# Patient Record
Sex: MALE | Race: WHITE | NOT HISPANIC OR LATINO | Employment: UNEMPLOYED | URBAN - METROPOLITAN AREA
[De-identification: names, ages, dates, MRNs, and addresses within clinical notes are randomized per-mention and may not be internally consistent; named-entity substitution may affect disease eponyms.]

---

## 2017-02-06 DIAGNOSIS — H35.712 CENTRAL SEROUS CHORIORETINOPATHY OF LEFT EYE: Primary | ICD-10-CM

## 2017-02-15 ENCOUNTER — OFFICE VISIT (OUTPATIENT)
Dept: OPHTHALMOLOGY | Facility: CLINIC | Age: 47
End: 2017-02-15
Attending: OPHTHALMOLOGY
Payer: COMMERCIAL

## 2017-02-15 DIAGNOSIS — H35.712 CENTRAL SEROUS CHORIORETINOPATHY OF LEFT EYE: ICD-10-CM

## 2017-02-15 PROCEDURE — 99214 OFFICE O/P EST MOD 30 MIN: CPT | Mod: 25,ZF

## 2017-02-15 PROCEDURE — 99214 OFFICE O/P EST MOD 30 MIN: CPT

## 2017-02-15 PROCEDURE — 92015 DETERMINE REFRACTIVE STATE: CPT | Mod: ZF

## 2017-02-15 ASSESSMENT — REFRACTION_MANIFEST
OS_ADD: +1.75
OS_SPHERE: -0.50
OD_AXIS: 110
OS_AXIS: 008
OS_CYLINDER: +1.00
OD_SPHERE: -0.50
OD_ADD: +1.75
OD_CYLINDER: +4.50

## 2017-02-15 ASSESSMENT — CUP TO DISC RATIO
OS_RATIO: 0.65
OD_RATIO: 0.65

## 2017-02-15 ASSESSMENT — REFRACTION_WEARINGRX
OD_SPHERE: -0.75
OS_CYLINDER: +1.00
OS_AXIS: 008
OS_SPHERE: -0.50
OD_CYLINDER: +4.50
OS_ADD: +1.75
OD_ADD: +1.75
OD_AXIS: 110

## 2017-02-15 ASSESSMENT — TONOMETRY
IOP_METHOD: TONOPEN
OD_IOP_MMHG: 17
OS_IOP_MMHG: 16

## 2017-02-15 ASSESSMENT — VISUAL ACUITY
OS_CC+: -2
METHOD: SNELLEN - LINEAR
CORRECTION_TYPE: GLASSES
OS_CC: 20/25
OD_CC+: -3
OD_CC: 20/20

## 2017-02-15 ASSESSMENT — EXTERNAL EXAM - RIGHT EYE: OD_EXAM: NORMAL

## 2017-02-15 ASSESSMENT — EXTERNAL EXAM - LEFT EYE: OS_EXAM: NORMAL

## 2017-02-15 ASSESSMENT — CONF VISUAL FIELD
OS_NORMAL: 1
OD_NORMAL: 1

## 2017-02-15 NOTE — PROGRESS NOTES
CC: follow up Central serous retinopathy  HPI: 46-year-old gentleman who has a history of vision loss in the left eye since 2006.    No acute changes in vision, no flashes and floaters     Imaging:   OCT 2/17/17 :    RIGHT EYE:  OCT revealed good foveal contour.  There were no cystic changes.    LEFT EYE:   good foveal contour. Stable appearance from last OCT--disruption of the IS/OS junction and retinal pigment epithelium. No subretinal fluid. Thick choroid    AF 2/17/17 :   Right eye: no abnormalities  Left eye: Retinal pigment epithelium atrophy with surrounding hyperfluorescence.     Assessment/Plan :   1)  Central serous chorioretinopathy, left eye.   Stable distortion   Stable without subretinal fluid.   Continue using Amsler grid    Used Flonase nasal spray rarely    Patient with history of sleep apnea- diagnosed 2 ys ago. Uses CPAP   Stop caffeine intake    2)  Cataract, both eyes   becoming visually significant    monitor       3)  History of Chiari I malformation based on MRI findings.     Follow up 1 yr with retina. Prescription next time    ~~~~~~~~~~~~~~~~~~~~~~~~~~~~~~~~~~~~~~~~~~~~~~~~~~~~~~~~~~~~~~~~~~~~~~~~~~~~~~~~~~  I personally viewed the patient's HPI and review of systems entered by [employer].  I have discussed the case and the management of this patient's care with the Resident/Fellow, if applicable. I also have reviewed and agree with the assessment and plan as stated above and agree with all of its relevant components. I personally viewed the patient images and I agree with the interpretation as documented by the resident/fellow and edited by me. I was present for critical portions of the procedure and was immediately available for the entire procedure.  Diana Patrcik M.D.

## 2017-02-15 NOTE — MR AVS SNAPSHOT
After Visit Summary   2/15/2017    Zaheer Mcgee    MRN: [id number]           Patient Information     Date Of Birth          1970        Visit Information        Provider Department      2/15/2017 8:45 AM Diana Patrick MD [facility]        Today's Diagnoses     Central serous chorioretinopathy of left eye           Follow-ups after your visit        Follow-up notes from your care team     Return in about 1 year (around 2/15/2018) for OCT AF.      Your next 10 appointments already scheduled     Feb 14, 2018  8:30 AM CST   RETURN RETINA with Diana Patrick MD   Eye Clinic ([facility])    [address]  [address] [place]  [address]  [facility] [address] [phone]              Who to contact     Please call your clinic at [phone] to:    Ask questions about your health    Make or cancel appointments    Discuss your medicines    Learn about your test results    Speak to your doctor   If you have compliments or concerns about an experience at your clinic, or if you wish to file a complaint, please contact [facility] Patient Relations at [phone] or email us at [email or web address]         Additional Information About Your Visit        MyChart Information     [employer] gives you secure access to your electronic health record. If you see a primary care provider, you can also send messages to your care team and make appointments. If you have questions, please call your primary care clinic.  If you do not have a primary care provider, please call [phone] and they will assist you.      [employer] is an electronic gateway that provides easy, online access to your medical records. With [employer], you can request a clinic appointment, read your test results, renew a prescription or communicate with your care team.     To access your existing account, please contact your [facility] or  call [phone] for assistance.        Care EveryWhere ID     This is your Care EveryWhere ID. This could be used by other organizations to access your [place] medical records  [id number]         Blood Pressure from Last 3 Encounters:   07/18/12 124/80    Weight from Last 3 Encounters:   07/18/12 78.9 kg (174 lb)              We Performed the Following     Fundus Autofluorescence Image (FAF) OU (both eyes)     OCT Retina Spectralis OU (both eyes)        Primary Care Provider Office Phone # Fax #    [facility] [phone] [address]  [facility] [address]        Thank you!     Thank you for choosing EYE CLINIC  for your care. Our goal is always to provide you with excellent care. Hearing back from our patients is one way we can continue to improve our services. Please take a few minutes to complete the written survey that you may receive in the mail after your visit with us. Thank you!             Your Updated Medication List - Protect others around you: Learn how to safely use, store and throw away your medicines at www.disposemymeds.org.          This list is accurate as of: 2/15/17 11:10 AM.  Always use your most recent med list.                   Brand Name Dispense Instructions for use    IBUPROFEN PO      Take 600 mg by mouth 3 times daily       NO ACTIVE MEDICATIONS          OMEPRAZOLE PO      Take by mouth daily       TRAMADOL HCL PO      Take by mouth 2 times daily       ZANTAC PO      Take by mouth At Bedtime

## 2017-02-15 NOTE — NURSING NOTE
Chief Complaints and History of Present Illnesses   Patient presents with     Follow Up For      LE     HPI    Last Eye Exam:  2/4/16   Affected eye(s):  Left   Symptoms:     Decreased vision   Itching      Duration:  1 year   Frequency:  Constant       Do you have eye pain now?:  No      Comments:  Pt returns for yearly follow up on health of eyes. Pt feels that vision has gradually changed and requests a new glasses rx today. BE feel occasionally itch, notes that it is mostly environmental issues with work. CRISTIAN MILTON, COA 9:30 AM 02/15/2017

## 2018-01-29 DIAGNOSIS — H35.712 CENTRAL SEROUS CHORIORETINOPATHY OF LEFT EYE: Primary | ICD-10-CM

## 2019-02-11 ENCOUNTER — TELEPHONE (OUTPATIENT)
Dept: OPHTHALMOLOGY | Facility: CLINIC | Age: 49
End: 2019-02-11

## 2019-02-11 NOTE — TELEPHONE ENCOUNTER
Health Call Center    Phone Message    May a detailed message be left on voicemail: yes    Reason for Call: Other: Pt is needing Dr.Sandra Patrick to fax all of his records to [facility] clinic attn to Jessie Ge. [facility] number is [phone]. Please fax pts records to the [facility]. Please call the pt. Thank you     Action Taken: Message routed to:  [facility] Eye

## 2019-02-14 ENCOUNTER — TELEPHONE (OUTPATIENT)
Dept: OPHTHALMOLOGY | Facility: CLINIC | Age: 49
End: 2019-02-14

## 2019-02-14 NOTE — TELEPHONE ENCOUNTER
Note to facilitator for further assistance  Saleem Melendez RN 9:36 AM 02/15/19        [facility]    Phone Message    May a detailed message be left on voicemail: yes    Reason for Call: Other: Pt states that the STEPHANIE forrm that was faxed to [facility] attn to Jessie Ge they did not receive. Pt wants to know if the STEPHANIE could be faxed to him at [phone]. Please advise thank you.       Action Taken: Message routed to:  [facility] Eye

## 2019-10-04 ENCOUNTER — HEALTH MAINTENANCE LETTER (OUTPATIENT)
Age: 49
End: 2019-10-04

## 2020-11-08 ENCOUNTER — HEALTH MAINTENANCE LETTER (OUTPATIENT)
Age: 50
End: 2020-11-08

## 2021-09-11 ENCOUNTER — HEALTH MAINTENANCE LETTER (OUTPATIENT)
Age: 51
End: 2021-09-11

## 2021-09-20 ENCOUNTER — APPOINTMENT (OUTPATIENT)
Dept: CT IMAGING | Facility: HOSPITAL | Age: 51
End: 2021-09-20
Attending: PHYSICIAN ASSISTANT
Payer: COMMERCIAL

## 2021-09-20 ENCOUNTER — HOSPITAL ENCOUNTER (EMERGENCY)
Facility: HOSPITAL | Age: 51
Discharge: HOME | End: 2021-09-20
Attending: PHYSICIAN ASSISTANT | Admitting: PHYSICIAN ASSISTANT
Payer: COMMERCIAL

## 2021-09-20 VITALS
RESPIRATION RATE: 16 BRPM | HEART RATE: 83 BPM | DIASTOLIC BLOOD PRESSURE: 82 MMHG | SYSTOLIC BLOOD PRESSURE: 130 MMHG | TEMPERATURE: 98.2 F | OXYGEN SATURATION: 97 %

## 2021-09-20 DIAGNOSIS — R10.31 RIGHT LOWER QUADRANT PAIN: ICD-10-CM

## 2021-09-20 LAB
ALBUMIN UR-MCNC: NEGATIVE MG/DL
ANION GAP SERPL CALCULATED.3IONS-SCNC: 4 MMOL/L (ref 3–14)
APPEARANCE UR: CLEAR
BASOPHILS # BLD AUTO: 0.1 10E3/UL (ref 0–0.2)
BASOPHILS NFR BLD AUTO: 1 %
BILIRUB UR QL STRIP: NEGATIVE
BUN SERPL-MCNC: 16 MG/DL (ref 7–30)
CALCIUM SERPL-MCNC: 8.9 MG/DL (ref 8.5–10.1)
CHLORIDE BLD-SCNC: 106 MMOL/L (ref 94–109)
CO2 SERPL-SCNC: 29 MMOL/L (ref 20–32)
COLOR UR AUTO: NORMAL
CREAT SERPL-MCNC: 0.83 MG/DL (ref 0.66–1.25)
EOSINOPHIL # BLD AUTO: 0.3 10E3/UL (ref 0–0.7)
EOSINOPHIL NFR BLD AUTO: 2 %
ERYTHROCYTE [DISTWIDTH] IN BLOOD BY AUTOMATED COUNT: 12.9 % (ref 10–15)
GFR SERPL CREATININE-BSD FRML MDRD: >90 ML/MIN/1.73M2
GLUCOSE BLD-MCNC: 104 MG/DL (ref 70–99)
GLUCOSE UR STRIP-MCNC: NEGATIVE MG/DL
HCT VFR BLD AUTO: 47.5 % (ref 40–53)
HGB BLD-MCNC: 15.8 G/DL (ref 13.3–17.7)
HGB UR QL STRIP: NEGATIVE
IMM GRANULOCYTES # BLD: 0.1 10E3/UL
IMM GRANULOCYTES NFR BLD: 0 %
KETONES UR STRIP-MCNC: NEGATIVE MG/DL
LEUKOCYTE ESTERASE UR QL STRIP: NEGATIVE
LYMPHOCYTES # BLD AUTO: 2.4 10E3/UL (ref 0.8–5.3)
LYMPHOCYTES NFR BLD AUTO: 18 %
MCH RBC QN AUTO: 29.6 PG (ref 26.5–33)
MCHC RBC AUTO-ENTMCNC: 33.3 G/DL (ref 31.5–36.5)
MCV RBC AUTO: 89 FL (ref 78–100)
MONOCYTES # BLD AUTO: 1.2 10E3/UL (ref 0–1.3)
MONOCYTES NFR BLD AUTO: 8 %
NEUTROPHILS # BLD AUTO: 9.7 10E3/UL (ref 1.6–8.3)
NEUTROPHILS NFR BLD AUTO: 71 %
NITRATE UR QL: NEGATIVE
NRBC # BLD AUTO: 0 10E3/UL
NRBC BLD AUTO-RTO: 0 /100
PH UR STRIP: 7 [PH] (ref 4.7–8)
PLATELET # BLD AUTO: 309 10E3/UL (ref 150–450)
POTASSIUM BLD-SCNC: 4 MMOL/L (ref 3.4–5.3)
RBC # BLD AUTO: 5.34 10E6/UL (ref 4.4–5.9)
RBC URINE: 1 /HPF
SODIUM SERPL-SCNC: 139 MMOL/L (ref 133–144)
SP GR UR STRIP: 1.02 (ref 1–1.03)
SQUAMOUS EPITHELIAL: 0 /HPF
UROBILINOGEN UR STRIP-MCNC: NORMAL MG/DL
WBC # BLD AUTO: 13.6 10E3/UL (ref 4–11)
WBC URINE: 2 /HPF

## 2021-09-20 PROCEDURE — 99284 EMERGENCY DEPT VISIT MOD MDM: CPT | Mod: 25

## 2021-09-20 PROCEDURE — 258N000003 HC RX IP 258 OP 636: Performed by: PHYSICIAN ASSISTANT

## 2021-09-20 PROCEDURE — 74176 CT ABD & PELVIS W/O CONTRAST: CPT

## 2021-09-20 PROCEDURE — 81001 URINALYSIS AUTO W/SCOPE: CPT | Performed by: PHYSICIAN ASSISTANT

## 2021-09-20 PROCEDURE — 36415 COLL VENOUS BLD VENIPUNCTURE: CPT | Performed by: PHYSICIAN ASSISTANT

## 2021-09-20 PROCEDURE — 80048 BASIC METABOLIC PNL TOTAL CA: CPT | Performed by: PHYSICIAN ASSISTANT

## 2021-09-20 PROCEDURE — 99284 EMERGENCY DEPT VISIT MOD MDM: CPT | Performed by: PHYSICIAN ASSISTANT

## 2021-09-20 PROCEDURE — 250N000013 HC RX MED GY IP 250 OP 250 PS 637: Performed by: PHYSICIAN ASSISTANT

## 2021-09-20 PROCEDURE — 85025 COMPLETE CBC W/AUTO DIFF WBC: CPT | Performed by: PHYSICIAN ASSISTANT

## 2021-09-20 RX ORDER — SODIUM CHLORIDE 9 MG/ML
INJECTION, SOLUTION INTRAVENOUS CONTINUOUS
Status: DISCONTINUED | OUTPATIENT
Start: 2021-09-20 | End: 2021-09-20 | Stop reason: HOSPADM

## 2021-09-20 RX ORDER — FAMOTIDINE 20 MG/1
20 TABLET, FILM COATED ORAL
COMMUNITY
Start: 2021-03-03 | End: 2023-03-29 | Stop reason: DRUGHIGH

## 2021-09-20 RX ORDER — LISINOPRIL 10 MG/1
TABLET ORAL
COMMUNITY
Start: 2021-03-12 | End: 2023-02-08

## 2021-09-20 RX ORDER — ESCITALOPRAM OXALATE 20 MG/1
20 TABLET ORAL
COMMUNITY
Start: 2021-01-27 | End: 2023-02-08

## 2021-09-20 RX ORDER — OXYCODONE HYDROCHLORIDE 5 MG/1
5 TABLET ORAL ONCE
Status: COMPLETED | OUTPATIENT
Start: 2021-09-20 | End: 2021-09-20

## 2021-09-20 RX ADMIN — SODIUM CHLORIDE 1000 ML: 9 INJECTION, SOLUTION INTRAVENOUS at 14:34

## 2021-09-20 RX ADMIN — OXYCODONE HYDROCHLORIDE 5 MG: 5 TABLET ORAL at 14:25

## 2021-09-20 NOTE — Clinical Note
Zaheer Mcgee was seen and treated in our emergency department on 9/20/2021.  He may return to work on 09/23/2021.  Please excuse Zaheer from work until September 23, 2021.     If you have any questions or concerns, please don't hesitate to call.      Cody Nino PA-C

## 2021-09-20 NOTE — ED TRIAGE NOTES
Pt here with right lower back/flank to his right groin.  Also reports trouble with urinating. Retention problems. Has not noted any blood in it No hx of kidney

## 2021-09-20 NOTE — ED NOTES
Patient given discharge instructions to take tylenol as needed and as directed.     Patient verbalized understanding. Patient condition improved upon discharge.

## 2021-09-20 NOTE — DISCHARGE INSTRUCTIONS
Seen in the emergency department for having pain in your right lower back and your groin.  CT scan showed no signs of appendicitis, no signs of kidney stone, and no signs of diverticulitis.  Also showed no signs of hernia.  Blood work showed no signs systemic infection and appropriate kidney function.  Urinary analysis showed no signs of infection.  This is all reassuring.  If you started having chills or sweats, worsening pain, blood in stool or urine, or inability to eat and drink fluid without vomiting please return to emergency department.  You can take Tylenol for discomfort 1000 mg every 8 hours.  Please follow-up with your clinic doctor on the appointment you have this coming Wednesday.

## 2021-09-20 NOTE — ED PROVIDER NOTES
History     Chief Complaint   Patient presents with     Flank Pain     Dysuria     HPI  Zaheer Mcgee is a 51 year old male who arrives to the emergency department with right CVA pain radiating down to his groin.  This started approximately 3 days ago where patient had pain in his right lower quadrant he noticed that it started in his back today and he also noticed oliguria upon attempted urination.  Patient has no history of BPH.  He has a past medical history of ADHD, anxiety, depression, dyspnea on exertion, GERD, pretension, and ZOFIA.  Patient describes his pain as coming and going currently is 5 out of 10 starting from his right flank and radiating down towards his groin.  He does not have any history of abdominal surgeries and is passing gas without issue.    Allergies:  No Known Allergies    Problem List:    Patient Active Problem List    Diagnosis Date Noted     Central serous retinopathy 03/11/2015     Priority: Medium     Cataract 03/11/2015     Priority: Medium        Past Medical History:    Past Medical History:   Diagnosis Date     Chiari I malformation (H)       (central serous retinopathy)      Nonsenile cataract        Past Surgical History:    Past Surgical History:   Procedure Laterality Date     groin surgery  1999     STRABISMUS SURGERY Right 1972       Family History:    Family History   Problem Relation Age of Onset     Glaucoma Mother      Diabetes Maternal Grandmother        Social History:  Marital Status:   [2]  Social History     Tobacco Use     Smoking status: Former Smoker     Packs/day: 2.00     Smokeless tobacco: Never Used   Substance Use Topics     Alcohol use: Not on file     Drug use: Not on file        Medications:    escitalopram (LEXAPRO) 20 MG tablet  famotidine (PEPCID) 20 MG tablet  lisinopril (ZESTRIL) 10 MG tablet  OMEPRAZOLE PO  TRAMADOL HCL PO          Review of Systems he denies any chills or sweats, any headache dizziness or blurry vision, any  difficulty swallowing, denies chest pain or palpitations, shortness of breath or cough, endorses right lower quadrant abdominal pain, denies nausea or vomiting, denies any bowel changes he does state that it is difficult to pee and causes some pain.    Physical Exam   BP: 144/92  Pulse: 83  Temp: 99.6  F (37.6  C)  Resp: 16  SpO2: 98 %      Physical Exam  Constitutional: Alert and conversant. NAD   HENT: NCAT   Eyes: Normal pupils   Neck: supple   CV: Normal rate, regular rhythm, no murmur   Pulmonary/Chest: Non-labored respirations, clear to auscultation bilaterally   Abdominal: Soft, non-tender, non-distended   Gu: Normal-appearing external male genitalia testicles are vertical line in nature there is no inguinal hernia palpated.  MSK: ESPINOZA.   Neuro: Alert and appropriate   Skin: Warm and dry. No diaphoresis. No rashes on exposed skin    Psych: Appropriate mood and affect   ED Course   MDM: Differential diagnosis includes but is not limited to: Colic, hydronephrosis, pyelonephritis, cystitis, appendicitis, cholecystitis, cholangitis, as well as strain or sprain.    Patient does not have any Ingomar tenderness and no obturator sign I am less concerned of appendicitis at this time.  At this point will obtain a CBC, BMP, UA as well as a CT without contrast.  Also start with 5 mg of Roxicodone.  Patient has no changes with eating I am less concerned of any gallbladder pathology at this time.  Suspicion of renal colic.  ED Course as of Sep 20 1621   Mon Sep 20, 2021   1530 No signs of decreased kidney function appropriate electrolytes   Basic metabolic panel(!)   1530 Slight leukocytosis no sings of anemia    CBC with platelets differential(!)   1531 Negative for infection   UA with Microscopic reflex to Culture   1532 IMPRESSION:       No evidence of an acute intra-abdominal process. No hydronephrosis or  collecting system calculus.     An air containing intraspinal synovial cyst is suggested on the left  at L4-5  associated with a degenerated facet joint. Correlate for  evidence of left radiculopathy. Consider follow-up nonemergent MR  lumbar spine.   Abd/pelvis CT - no contrast - Stone Protocol   1532 In speaking with the radiologist there is no signs of renal colic no signs or appendicitis at this time will discharge home with recommendations of follow-up with primary care.      1546 Recommend to return to emergency department if patient starts having significant chills or sweats, worsening abdominal pain, inability to keep food and fluid down.        Procedures                  Results for orders placed or performed during the hospital encounter of 09/20/21 (from the past 24 hour(s))   UA with Microscopic reflex to Culture    Specimen: Urine, Midstream   Result Value Ref Range    Color Urine Light Yellow Colorless, Straw, Light Yellow, Yellow    Appearance Urine Clear Clear    Glucose Urine Negative Negative mg/dL    Bilirubin Urine Negative Negative    Ketones Urine Negative Negative mg/dL    Specific Gravity Urine 1.025 1.003 - 1.035    Blood Urine Negative Negative    pH Urine 7.0 4.7 - 8.0    Protein Albumin Urine Negative Negative mg/dL    Urobilinogen Urine Normal Normal, 2.0 mg/dL    Nitrite Urine Negative Negative    Leukocyte Esterase Urine Negative Negative    RBC Urine 1 <=2 /HPF    WBC Urine 2 <=5 /HPF    Squamous Epithelials Urine 0 <=1 /HPF    Narrative    Urine Culture not indicated   CBC with platelets differential    Narrative    The following orders were created for panel order CBC with platelets differential.  Procedure                               Abnormality         Status                     ---------                               -----------         ------                     CBC with platelets and d...[[id number]]  Abnormal            Final result                 Please view results for these tests on the individual orders.   Basic metabolic panel   Result Value Ref Range    Sodium 139 133 - 144  mmol/L    Potassium 4.0 3.4 - 5.3 mmol/L    Chloride 106 94 - 109 mmol/L    Carbon Dioxide (CO2) 29 20 - 32 mmol/L    Anion Gap 4 3 - 14 mmol/L    Urea Nitrogen 16 7 - 30 mg/dL    Creatinine 0.83 0.66 - 1.25 mg/dL    Calcium 8.9 8.5 - 10.1 mg/dL    Glucose 104 (H) 70 - 99 mg/dL    GFR Estimate >90 >60 mL/min/1.73m2   CBC with platelets and differential   Result Value Ref Range    WBC Count 13.6 (H) 4.0 - 11.0 10e3/uL    RBC Count 5.34 4.40 - 5.90 10e6/uL    Hemoglobin 15.8 13.3 - 17.7 g/dL    Hematocrit 47.5 40.0 - 53.0 %    MCV 89 78 - 100 fL    MCH 29.6 26.5 - 33.0 pg    MCHC 33.3 31.5 - 36.5 g/dL    RDW 12.9 10.0 - 15.0 %    Platelet Count 309 150 - 450 10e3/uL    % Neutrophils 71 %    % Lymphocytes 18 %    % Monocytes 8 %    % Eosinophils 2 %    % Basophils 1 %    % Immature Granulocytes 0 %    NRBCs per 100 WBC 0 <1 /100    Absolute Neutrophils 9.7 (H) 1.6 - 8.3 10e3/uL    Absolute Lymphocytes 2.4 0.8 - 5.3 10e3/uL    Absolute Monocytes 1.2 0.0 - 1.3 10e3/uL    Absolute Eosinophils 0.3 0.0 - 0.7 10e3/uL    Absolute Basophils 0.1 0.0 - 0.2 10e3/uL    Absolute Immature Granulocytes 0.1 (H) <=0.0 10e3/uL    Absolute NRBCs 0.0 10e3/uL   Abd/pelvis CT - no contrast - Stone Protocol    Narrative    PROCEDURE:  CT ABDOMEN PELVIS W/O CONTRAST    HISTORY:  Abdominal pain, acute, nonlocalized    TECHNIQUE:  Helical CT of the abdomen and pelvis was performed without  intravenous contrast.    COMPARISON:  None.    FINDINGS:      Evaluation of the solid organs is somewhat limited due to the lack of  intravenous contrast.    Limited views through the lung bases show no focal consolidation or  intrapulmonary mass.     The liver is normal in size and attenuation. The gallbladder is  present. The spleen, pancreas and adrenal glands are unremarkable.   There is no evidence of hydronephrosis. There is no abdominal aortic  aneurysm.  The prostate is enlarged. The bowel is normal in caliber.     No free fluid, free air or  adenopathy is present.  No suspicious  osseous lesions are identified. An air containing intraspinal synovial  cyst is suggested on the left at L4-5 associated with a degenerated  facet joint.      Impression    IMPRESSION:      No evidence of an acute intra-abdominal process. No hydronephrosis or  collecting system calculus.    An air containing intraspinal synovial cyst is suggested on the left  at L4-5 associated with a degenerated facet joint. Correlate for  evidence of left radiculopathy. Consider follow-up nonemergent MR  lumbar spine.    ARTEMIO GO MD         SYSTEM ID:  [id number]       Medications   0.9% sodium chloride BOLUS (0 mLs Intravenous Stopped 9/20/21 1537)     Followed by   sodium chloride 0.9% infusion (has no administration in time range)   oxyCODONE (ROXICODONE) tablet 5 mg (5 mg Oral Given 9/20/21 1425)       Assessments & Plan (with Medical Decision Making)     I have reviewed the nursing notes.    I have reviewed the findings, diagnosis, plan and need for follow up with the patient.    New Prescriptions    No medications on file       Final diagnoses:   Right lower quadrant pain       9/20/2021   [facility]     Cody Nino PA-C  09/20/21 1426       Cody Nino PA-C  09/20/21 1622

## 2021-09-27 ENCOUNTER — TRANSFERRED RECORDS (OUTPATIENT)
Dept: HEALTH INFORMATION MANAGEMENT | Facility: CLINIC | Age: 51
End: 2021-09-27

## 2021-12-15 DIAGNOSIS — H35.719 CENTRAL SEROUS RETINOPATHY: Primary | ICD-10-CM

## 2021-12-17 ENCOUNTER — TRANSFERRED RECORDS (OUTPATIENT)
Dept: HEALTH INFORMATION MANAGEMENT | Facility: CLINIC | Age: 51
End: 2021-12-17

## 2022-01-01 ENCOUNTER — HEALTH MAINTENANCE LETTER (OUTPATIENT)
Age: 52
End: 2022-01-01

## 2022-01-18 ENCOUNTER — TRANSFERRED RECORDS (OUTPATIENT)
Dept: HEALTH INFORMATION MANAGEMENT | Facility: CLINIC | Age: 52
End: 2022-01-18

## 2022-02-03 ENCOUNTER — OFFICE VISIT (OUTPATIENT)
Dept: OPHTHALMOLOGY | Facility: CLINIC | Age: 52
End: 2022-02-03
Attending: OPHTHALMOLOGY
Payer: COMMERCIAL

## 2022-02-03 DIAGNOSIS — H35.713 CENTRAL SEROUS CHORIORETINOPATHY OF BOTH EYES: ICD-10-CM

## 2022-02-03 DIAGNOSIS — H35.713 CENTRAL SEROUS CHORIORETINOPATHY OF BOTH EYES: Primary | ICD-10-CM

## 2022-02-03 PROCEDURE — 92004 COMPRE OPH EXAM NEW PT 1/>: CPT | Performed by: OPHTHALMOLOGY

## 2022-02-03 PROCEDURE — 92250 FUNDUS PHOTOGRAPHY W/I&R: CPT | Performed by: OPHTHALMOLOGY

## 2022-02-03 PROCEDURE — G0463 HOSPITAL OUTPT CLINIC VISIT: HCPCS | Mod: 25

## 2022-02-03 PROCEDURE — 92134 CPTRZ OPH DX IMG PST SGM RTA: CPT | Performed by: OPHTHALMOLOGY

## 2022-02-03 PROCEDURE — 99207 FUNDUS AUTOFLUORESCENCE IMAGE (FAF) OU (BOTH EYES): CPT | Performed by: OPHTHALMOLOGY

## 2022-02-03 RX ORDER — VENLAFAXINE 75 MG/1
75 TABLET ORAL 3 TIMES DAILY
COMMUNITY
End: 2023-02-08

## 2022-02-03 RX ORDER — CALCIUM CARBONATE 500(1250)
1 TABLET ORAL 2 TIMES DAILY
COMMUNITY
End: 2023-02-08

## 2022-02-03 RX ORDER — TERBINAFINE HYDROCHLORIDE 250 MG/1
250 TABLET ORAL DAILY
COMMUNITY
End: 2023-02-08

## 2022-02-03 ASSESSMENT — TONOMETRY
OD_IOP_MMHG: 18
OS_IOP_MMHG: 18
IOP_METHOD: TONOPEN

## 2022-02-03 ASSESSMENT — REFRACTION_WEARINGRX
OD_CYLINDER: +4.25
OD_SPHERE: -0.25
OS_ADD: +2.25
OD_AXIS: 112
OD_ADD: +2.25
OS_SPHERE: -0.75
OS_CYLINDER: +1.50
OS_AXIS: 007

## 2022-02-03 ASSESSMENT — VISUAL ACUITY
OS_CC+: +1
METHOD: SNELLEN - LINEAR
OD_CC+: +2
OS_CC: 20/30
OD_CC: 20/25
CORRECTION_TYPE: GLASSES

## 2022-02-03 ASSESSMENT — EXTERNAL EXAM - LEFT EYE: OS_EXAM: NORMAL

## 2022-02-03 ASSESSMENT — EXTERNAL EXAM - RIGHT EYE: OD_EXAM: NORMAL

## 2022-02-03 ASSESSMENT — CUP TO DISC RATIO
OS_RATIO: 0.65
OD_RATIO: 0.65

## 2022-02-03 ASSESSMENT — CONF VISUAL FIELD
OS_NORMAL: 1
OD_NORMAL: 1

## 2022-02-03 NOTE — NURSING NOTE
Chief Complaints and History of Present Illnesses   Patient presents with     Retinal Evaluation     Chief Complaint(s) and History of Present Illness(es)     Retinal Evaluation     Laterality: both eyes    Associated symptoms: tearing.  Negative for floaters, eye pain and flashes    Treatments tried: no treatments    Pain scale: 0/10              Comments     CRS LE  [facility]   history of vision loss in the left eye since 2006 has been stable  Wondering if any new treatments or cures   Got new glasses on order  Vijaya Baker COA 10:38 AM February 3, 2022

## 2022-02-03 NOTE — PROGRESS NOTES
CC: follow up Central serous retinopathy  HPI: 51-year-old gentleman who has a history of vision loss in the left eye since 2006.  Thought to be Central serous retinopathy   Interval: No acute changes in vision, no flashes and floaters     Imaging:   OCT 02/04/22 :    RIGHT EYE:  OCT revealed good foveal contour.  There were no cystic changes.    LEFT EYE:   good foveal contour. Stable appearance from last OCT--disruption of the IS/OS junction and retinal pigment epithelium. No subretinal fluid. Thick choroid. Small shallow pigment epithelial detachment   optos pic 02/04/22 consistent with exam  AF 02/04/22  :   Right eye: no abnormalities  Left eye: Retinal pigment epithelium atrophy with surrounding hyperfluorescence.     Assessment/Plan :   # possible history of Central serous chorioretinopathy, left eye.  Differential diagnosis: other causes of maculopathy. No progression   Stable distortion   Stable exam and Optical Coherence Tomography without subretinal fluid.   observe   Continue using Amsler grid    Used Flonase nasal spray rarely    Patient with history of sleep apnea- diagnosed 2 ys ago. 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 Prescription next time  ~~~~~~~~~~~~~~~~~~~~~~~~~~~~~~~~~~   Complete documentation of historical and exam elements from today's encounter can be found in the full encounter summary report (not reduplicated in this progress note).  I personally obtained the chief complaint(s) and history of present illness.  I confirmed and edited as necessary the review of systems, past medical/surgical history, family history, social history, and examination findings as documented by others; and I examined the patient myself.  I personally reviewed the relevant tests, images, and reports as documented above.  I formulated and edited as necessary  the assessment and plan and discussed the findings and management plan with the patient and family    Diana Patrick MD   of Ophthalmology.  Retina Service   Department of Ophthalmology and Visual Neurosciences   [facility]  Phone: [phone]   Fax: [phone]

## 2022-02-14 ENCOUNTER — TRANSFERRED RECORDS (OUTPATIENT)
Dept: HEALTH INFORMATION MANAGEMENT | Facility: CLINIC | Age: 52
End: 2022-02-14

## 2022-02-28 ENCOUNTER — TRANSFERRED RECORDS (OUTPATIENT)
Dept: HEALTH INFORMATION MANAGEMENT | Facility: CLINIC | Age: 52
End: 2022-02-28

## 2022-03-09 ENCOUNTER — TRANSFERRED RECORDS (OUTPATIENT)
Dept: HEALTH INFORMATION MANAGEMENT | Facility: CLINIC | Age: 52
End: 2022-03-09

## 2022-08-01 ENCOUNTER — TRANSFERRED RECORDS (OUTPATIENT)
Dept: HEALTH INFORMATION MANAGEMENT | Facility: CLINIC | Age: 52
End: 2022-08-01

## 2022-09-01 ENCOUNTER — TRANSFERRED RECORDS (OUTPATIENT)
Dept: HEALTH INFORMATION MANAGEMENT | Facility: CLINIC | Age: 52
End: 2022-09-01

## 2022-10-30 ENCOUNTER — HEALTH MAINTENANCE LETTER (OUTPATIENT)
Age: 52
End: 2022-10-30

## 2023-01-26 DIAGNOSIS — H35.713 CENTRAL SEROUS CHORIORETINOPATHY OF BOTH EYES: Primary | ICD-10-CM

## 2023-02-08 ENCOUNTER — OFFICE VISIT (OUTPATIENT)
Dept: OPHTHALMOLOGY | Facility: CLINIC | Age: 53
End: 2023-02-08
Attending: OPHTHALMOLOGY
Payer: COMMERCIAL

## 2023-02-08 DIAGNOSIS — H35.713 CENTRAL SEROUS CHORIORETINOPATHY OF BOTH EYES: ICD-10-CM

## 2023-02-08 PROCEDURE — 92134 CPTRZ OPH DX IMG PST SGM RTA: CPT | Performed by: OPHTHALMOLOGY

## 2023-02-08 PROCEDURE — 92250 FUNDUS PHOTOGRAPHY W/I&R: CPT | Performed by: OPHTHALMOLOGY

## 2023-02-08 PROCEDURE — G0463 HOSPITAL OUTPT CLINIC VISIT: HCPCS | Mod: 25

## 2023-02-08 PROCEDURE — 99213 OFFICE O/P EST LOW 20 MIN: CPT | Performed by: OPHTHALMOLOGY

## 2023-02-08 PROCEDURE — 99207 FUNDUS AUTOFLUORESCENCE IMAGE (FAF) OU (BOTH EYES): CPT | Mod: 26 | Performed by: OPHTHALMOLOGY

## 2023-02-08 ASSESSMENT — REFRACTION_WEARINGRX
OS_CYLINDER: +1.50
OD_ADD: +2.25
OD_AXIS: 112
OD_AXIS: 120
OS_CYLINDER: +1.50
OS_AXIS: 015
OS_SPHERE: -0.75
OD_CYLINDER: +4.25
OD_ADD: +2.25
OD_SPHERE: -0.25
SPECS_TYPE: PAL
OD_SPHERE: +0.75
OS_SPHERE: -0.75
OD_CYLINDER: +4.25
OS_AXIS: 007
OS_ADD: +2.25
OS_ADD: +2.25

## 2023-02-08 ASSESSMENT — EXTERNAL EXAM - RIGHT EYE: OD_EXAM: NORMAL

## 2023-02-08 ASSESSMENT — VISUAL ACUITY
OS_CC: 20/25
OD_CC: 20/20
METHOD: SNELLEN - LINEAR
OS_CC+: -2
OD_CC+: -1

## 2023-02-08 ASSESSMENT — CONF VISUAL FIELD
OD_NORMAL: 1
OS_SUPERIOR_NASAL_RESTRICTION: 0
OS_SUPERIOR_TEMPORAL_RESTRICTION: 0
OS_NORMAL: 1
METHOD: COUNTING FINGERS
OS_INFERIOR_NASAL_RESTRICTION: 0
OS_INFERIOR_TEMPORAL_RESTRICTION: 0
OD_SUPERIOR_TEMPORAL_RESTRICTION: 0
OD_SUPERIOR_NASAL_RESTRICTION: 0
OD_INFERIOR_NASAL_RESTRICTION: 0
OD_INFERIOR_TEMPORAL_RESTRICTION: 0

## 2023-02-08 ASSESSMENT — TONOMETRY
OD_IOP_MMHG: 22
IOP_METHOD: TONOPEN
OS_IOP_MMHG: 21

## 2023-02-08 ASSESSMENT — EXTERNAL EXAM - LEFT EYE: OS_EXAM: NORMAL

## 2023-02-08 ASSESSMENT — CUP TO DISC RATIO
OS_RATIO: 0.65
OD_RATIO: 0.65

## 2023-02-08 NOTE — NURSING NOTE
Chief Complaints and History of Present Illnesses   Patient presents with     Central Serous Retinopathy Follow Up     Chief Complaint(s) and History of Present Illness(es)     Central Serous Retinopathy Follow Up            Laterality: both eyes    Associated symptoms: tearing.  Negative for dryness, eye pain, flashes, floaters, itching and burning    Pain scale: 0/10          Comments    Natan is here to continue care for Central serous chorioretinopathy of both eyes. He feels vision is about the same as last visit. He is wearing new glasses. He says he has trouble focusing after waking for awhile.     Carlos Negron COT 7:48 AM February 8, 2023

## 2023-02-08 NOTE — PROGRESS NOTES
CC: follow up Central serous retinopathy  HPI: 52 year old gentleman who has a history of vision loss in the left eye since 2006.  Thought to be Central serous retinopathy   Interval: No acute changes in vision, no flashes and floaters     Imaging:   OCT 02/08/23:    RIGHT EYE:  OCT revealed good foveal contour.  There were no cystic changes.    LEFT EYE:   good foveal contour. Stable appearance from last OCT--disruption of the IS/OS junction and retinal pigment epithelium. No subretinal fluid. Thick choroid. Small shallow pigment epithelial detachment   optos pic 02/04/22 consistent with exam  AF 02/04/22  :   Right eye: no abnormalities  Left eye: Retinal pigment epithelium atrophy with surrounding hyperfluorescence.     Assessment/Plan :   # possible history of Central serous chorioretinopathy, left eye.  Differential diagnosis: other causes of maculopathy. No progression   Stable distortion   Stable exam and Optical Coherence Tomography. Pigment epithelial detachment without subretinal fluid.   observe   Continue using Amsler grid    Used Flonase nasal spray rarely    Patient with history of sleep apnea- diagnosed 2 ys ago. Uses CPAP   Stop caffeine intake    # Cataract, both eyes   becoming visually significant    monitor       # History of Chiari I malformation based on MRI findings    # glaucoma suspect   Based in increased c/d ratio   Highest intraocular pressure 22/21  Mother with history of glaucoma   History of taking steroids     Follow up in the next 6 months ([place]) for glaucoma testing with OVF24-2; pachmetry; ONFL; no dilation; gonio  Follow up with retina 1 yrs with dilation and Optical Coherence Tomography   ~~~~~~~~~~~~~~~~~~~~~~~~~~~~~~~~~~   Complete documentation of historical and exam elements from today's encounter can be found in the full encounter summary report (not reduplicated in this progress note).  I personally obtained the chief complaint(s) and history of present illness.  I  confirmed and edited as necessary the review of systems, past medical/surgical history, family history, social history, and examination findings as documented by others; and I examined the patient myself.  I personally reviewed the relevant tests, images, and reports as documented above.  I formulated and edited as necessary the assessment and plan and discussed the findings and management plan with the patient and family    Diana Patrick MD   of Ophthalmology.  Retina Service   Department of Ophthalmology and Visual Neurosciences   [facility]  Phone: [phone]   Fax: [phone]

## 2023-03-09 ENCOUNTER — OFFICE VISIT (OUTPATIENT)
Dept: OPHTHALMOLOGY | Facility: CLINIC | Age: 53
End: 2023-03-09
Attending: OPHTHALMOLOGY
Payer: COMMERCIAL

## 2023-03-09 DIAGNOSIS — H40.009 GLAUCOMA SUSPECT: Primary | ICD-10-CM

## 2023-03-09 PROCEDURE — 92083 EXTENDED VISUAL FIELD XM: CPT | Performed by: OPHTHALMOLOGY

## 2023-03-09 PROCEDURE — G0463 HOSPITAL OUTPT CLINIC VISIT: HCPCS | Performed by: OPHTHALMOLOGY

## 2023-03-09 PROCEDURE — 92133 CPTRZD OPH DX IMG PST SGM ON: CPT | Performed by: OPHTHALMOLOGY

## 2023-03-09 PROCEDURE — 99213 OFFICE O/P EST LOW 20 MIN: CPT | Mod: GC | Performed by: OPHTHALMOLOGY

## 2023-03-09 ASSESSMENT — VISUAL ACUITY
CORRECTION_TYPE: GLASSES
OS_CC+: -2
OD_CC+: -2
METHOD: SNELLEN - LINEAR
OS_PH_CC+: -1+2
OD_CC: 20/20
OS_PH_CC: 20/30
OS_CC: 20/50

## 2023-03-09 ASSESSMENT — REFRACTION_WEARINGRX
OS_CYLINDER: +1.50
OD_AXIS: 112
OD_SPHERE: -0.25
OS_SPHERE: -0.75
OD_CYLINDER: +4.25
OD_ADD: +2.25
OS_ADD: +2.25
OS_AXIS: 007

## 2023-03-09 ASSESSMENT — CONF VISUAL FIELD
METHOD: COUNTING FINGERS
OS_INFERIOR_NASAL_RESTRICTION: 0
OS_SUPERIOR_NASAL_RESTRICTION: 0
OS_INFERIOR_TEMPORAL_RESTRICTION: 0
OD_INFERIOR_TEMPORAL_RESTRICTION: 0
OS_NORMAL: 1
OD_SUPERIOR_NASAL_RESTRICTION: 0
OD_INFERIOR_NASAL_RESTRICTION: 0
OD_SUPERIOR_TEMPORAL_RESTRICTION: 0
OS_SUPERIOR_TEMPORAL_RESTRICTION: 0
OD_NORMAL: 1

## 2023-03-09 ASSESSMENT — TONOMETRY
OD_IOP_MMHG: 18
IOP_METHOD: TONOPEN
OS_IOP_MMHG: 18

## 2023-03-09 ASSESSMENT — PACHYMETRY
EXAM_DATE: 3/9/2023
OD_CT(UM): 513
OS_CT(UM): 544

## 2023-03-09 ASSESSMENT — EXTERNAL EXAM - LEFT EYE: OS_EXAM: NORMAL

## 2023-03-09 ASSESSMENT — EXTERNAL EXAM - RIGHT EYE: OD_EXAM: NORMAL

## 2023-03-09 NOTE — NURSING NOTE
Chief Complaints and History of Present Illnesses   Patient presents with     Retinal Evaluation     Chief Complaint(s) and History of Present Illness(es)     Retinal Evaluation            Laterality: both eyes    Onset: gradual    Onset: months ago    Quality: States va is the same since last visit      Severity: moderate    Frequency: intermittently    Associated symptoms: photophobia (more in the am).  Negative for flashes and floaters    Treatments tried: no treatments    Pain scale: 0/10          Comments    Here for Central serous chorioretinopathy of both eyes  Yasmine Shaver COT 1:32 PM March 9, 2023

## 2023-03-09 NOTE — PROGRESS NOTES
CC: follow up of  and glaucoma evalaution    Interval History Mar 9, 2023: LCV 2/8/2023. No acute changes in vision, no flashes and floaters. Feels vision in left eye is blurry some days in left eye.     HPI: Zaheer Mcgee is a 53 year old gentleman who has a history of vision loss in the left eye since 2006.  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  There were no cystic changes.    LEFT EYE:  good foveal contour. Stable appearance from last OCT--disruption of the IS/OS junction and retinal pigment epithelium. Thick choroid. Small shallow pigment epithelial detachment - stable    OCT RNFL 03/09/23   OD: normal thickness throughout  OS: temporal borderline thinning  Could be secondary to history of Central serous retinopathy?    OVF 24-2 03/09/23  OD: reliable, nasal step to superior arcuate. Patient reports fogginess of the lens with mask  Repeated 24-2 03/09/23 MD -2.6 sup arcuated improved significantly; although persistent small peripheral sup area of decreased sensitivity   OS: reliable, normal     AF 02/04/22 :   Right eye: no abnormalities  Left eye: Retinal pigment epithelium atrophy with surrounding hyperfluorescence.     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  Differential diagnosis: other causes of maculopathy. No progression   Stable distortion   Stable exam and Optical Coherence Tomography. Pigment epithelial detachment without subretinal fluid.   observe   Continue using Amsler grid    Used Flonase nasal spray rarely - hasn't used in years   Patient with history of sleep apnea- diagnosed 2 ys ago. Uses CPAP   Stop caffeine intake - drinks coffee daily 1 travel mug (24oz)  Stable follow up in 3 months     # Cataract, both eyes   becoming visually significant    monitor       # History of Chiari I malformation based on MRI findings    # glaucoma suspect OU  - Based in increased c/d  ratio   - Tmax 22/21  - IOP today 03/09/23 18/18  - FH: Mother with glaucoma (possibly)  - History of taking steroids   - Pachy: 513/544  - Last HVF 03/09/23: new superior arcuate right eye- improved after removing mask and repeated testing; OS normal (first time visual field - reliable OU)  - Last OCT RNFL 03/09/23: OD normal, OS temporal borderline thinning  - Hx of medication intolerance: none  - Hx of kidney stones or asthma: no  - Personal/family hx sickle cell: no  - Hx eye trauma: none  - Gonio: Open to  OU  -RNFL without thinning right eye; repeated visual field right eye showed improvement.  OS VF normal and RNFL thinning likely related to  related atrophy rather than glaucomatous change so will not start drops.    Follow up with retina 3 months with dilation and macula Optical Coherence Tomography     Chidi David MD  Resident Physician - PGY3  Department of Ophthalmology   [facility]    ~~~~~~~~~~~~~~~~~~~~~~~~~~~~~~~~~~   Complete documentation of historical and exam elements from today's encounter can be found in the full encounter summary report (not reduplicated in this progress note).  I personally obtained the chief complaint(s) and history of present illness.  I confirmed and edited as necessary the review of systems, past medical/surgical history, family history, social history, and examination findings as documented by others; and I examined the patient myself.  I personally reviewed the relevant tests, images, and reports as documented above.  I personally reviewed the ophthalmic test(s) associated with this encounter, agree with the interpretation(s) as documented by the resident/fellow, and have edited the corresponding report(s) as necessary.   I formulated and edited as necessary the assessment and plan and discussed the findings and management plan with the patient and family    Diana Patrick MD   of Ophthalmology.  Retina Service    Department of Ophthalmology and Visual Neurosciences   [facility]  Phone: [phone]   Fax: [phone]

## 2023-03-09 NOTE — PATIENT INSTRUCTIONS
See if there are any general ophthalmologists closer to where you live who you can see for possible glaucoma in your right eye.

## 2023-03-28 NOTE — PROGRESS NOTES
Assessment & Plan   Problem List Items Addressed This Visit    None  Visit Diagnoses     Other chronic pain    -  Primary    Relevant Medications    traMADol (ULTRAM) 50 MG tablet    Other Relevant Orders    Drug Confirmation Panel Urine with Creat    Lactose intolerance        Relevant Medications    loratadine (CLARITIN) 10 MG tablet    lactase (LACTAID) 3000 UNIT tablet    Prostate cancer (H)        Relevant Orders    PSA, screen    Routine history and physical examination of adult        Relevant Orders    CBC with platelets and differential (Completed)    Comprehensive metabolic panel (BMP + Alb, Alk Phos, ALT, AST, Total. Bili, TP)    Lipid Profile (Chol, Trig, HDL, LDL calc)    Gastroesophageal reflux disease with esophagitis without hemorrhage        Relevant Medications    loratadine (CLARITIN) 10 MG tablet    omeprazole (PRILOSEC OTC) 20 MG EC tablet    famotidine (PEPCID) 20 MG tablet           Review of prior external note(s) from - Outside records from outside clinics  50 minutes spent by me on the date of the encounter doing chart review, review of outside records, review of test results, interpretation of tests, patient visit and documentation            No follow-ups on file.    Avila Rivera, [facility] [place] [facility]    Subjective   Natan is a 53 year old, presenting for the following health issues:  Establish Care  No flowsheet data found.  HPI     Natan presents today to establish care.  He formerly was in [facility] and had primary care there.  He is on chronic Ultram for knee pain.  He also reports difficulty with GERD and dairy products which has been an ongoing issue for him.  He otherwise has no specific complaints but wants to have routine labs done today.       Concern - Establish Care       Previous PCP: Dr. Fritsch- [facility], [place]         Review of Systems   Constitutional, HEENT, cardiovascular, pulmonary, GI, , musculoskeletal, neuro, skin, endocrine and  psych systems are negative, except as otherwise noted.      Objective    /72 (BP Location: Left arm, Patient Position: Sitting, Cuff Size: Adult Regular)   Pulse 76   Temp 97.5  F (36.4  C) (Tympanic)   Resp 20   Wt 85.4 kg (188 lb 3.2 oz)   SpO2 100%   There is no height or weight on file to calculate BMI.  Physical Exam   GENERAL: healthy, alert and no distress  EYES: Eyes grossly normal to inspection, PERRL and conjunctivae and sclerae normal  HENT: ear canals and TM's normal, nose and mouth without ulcers or lesions  NECK: no adenopathy, no asymmetry, masses, or scars and thyroid normal to palpation  RESP: lungs clear to auscultation - no rales, rhonchi or wheezes  CV: regular rate and rhythm, normal S1 S2, no S3 or S4, no murmur, click or rub, no peripheral edema and peripheral pulses strong  ABDOMEN: soft, nontender, no hepatosplenomegaly, no masses and bowel sounds normal  MS: no gross musculoskeletal defects noted, no edema  SKIN: no suspicious lesions or rashes  NEURO: Normal strength and tone, mentation intact and speech normal  PSYCH: mentation appears normal, affect normal/bright

## 2023-03-29 ENCOUNTER — OFFICE VISIT (OUTPATIENT)
Dept: INTERNAL MEDICINE | Facility: OTHER | Age: 53
End: 2023-03-29
Attending: INTERNAL MEDICINE
Payer: COMMERCIAL

## 2023-03-29 VITALS
OXYGEN SATURATION: 100 % | RESPIRATION RATE: 20 BRPM | WEIGHT: 188.2 LBS | DIASTOLIC BLOOD PRESSURE: 72 MMHG | SYSTOLIC BLOOD PRESSURE: 116 MMHG | HEART RATE: 76 BPM | TEMPERATURE: 97.5 F

## 2023-03-29 DIAGNOSIS — Z00.00 ROUTINE HISTORY AND PHYSICAL EXAMINATION OF ADULT: ICD-10-CM

## 2023-03-29 DIAGNOSIS — K21.00 GASTROESOPHAGEAL REFLUX DISEASE WITH ESOPHAGITIS WITHOUT HEMORRHAGE: ICD-10-CM

## 2023-03-29 DIAGNOSIS — G89.29 OTHER CHRONIC PAIN: Primary | ICD-10-CM

## 2023-03-29 DIAGNOSIS — C61 PROSTATE CANCER (H): ICD-10-CM

## 2023-03-29 DIAGNOSIS — E73.9 LACTOSE INTOLERANCE: ICD-10-CM

## 2023-03-29 LAB
ALBUMIN SERPL BCG-MCNC: 4.3 G/DL (ref 3.5–5.2)
ALP SERPL-CCNC: 74 U/L (ref 40–129)
ALT SERPL W P-5'-P-CCNC: 38 U/L (ref 10–50)
ANION GAP SERPL CALCULATED.3IONS-SCNC: 9 MMOL/L (ref 7–15)
AST SERPL W P-5'-P-CCNC: 22 U/L (ref 10–50)
BASOPHILS # BLD AUTO: 0.1 10E3/UL (ref 0–0.2)
BASOPHILS NFR BLD AUTO: 1 %
BILIRUB SERPL-MCNC: 1.2 MG/DL
BUN SERPL-MCNC: 14.2 MG/DL (ref 6–20)
CALCIUM SERPL-MCNC: 9.3 MG/DL (ref 8.6–10)
CANNABINOIDS UR QL SCN: NORMAL
CHLORIDE SERPL-SCNC: 104 MMOL/L (ref 98–107)
CHOLEST SERPL-MCNC: 145 MG/DL
CREAT SERPL-MCNC: 0.79 MG/DL (ref 0.67–1.17)
CREAT UR-MCNC: 20 MG/DL
DEPRECATED HCO3 PLAS-SCNC: 29 MMOL/L (ref 22–29)
EOSINOPHIL # BLD AUTO: 0.3 10E3/UL (ref 0–0.7)
EOSINOPHIL NFR BLD AUTO: 3 %
ERYTHROCYTE [DISTWIDTH] IN BLOOD BY AUTOMATED COUNT: 13.6 % (ref 10–15)
GFR SERPL CREATININE-BSD FRML MDRD: >90 ML/MIN/1.73M2
GLUCOSE SERPL-MCNC: 96 MG/DL (ref 70–99)
HCT VFR BLD AUTO: 50.1 % (ref 40–53)
HDLC SERPL-MCNC: 52 MG/DL
HGB BLD-MCNC: 16.7 G/DL (ref 13.3–17.7)
LDLC SERPL CALC-MCNC: 76 MG/DL
LYMPHOCYTES # BLD AUTO: 2.6 10E3/UL (ref 0.8–5.3)
LYMPHOCYTES NFR BLD AUTO: 27 %
MCH RBC QN AUTO: 29.4 PG (ref 26.5–33)
MCHC RBC AUTO-ENTMCNC: 33.3 G/DL (ref 31.5–36.5)
MCV RBC AUTO: 88 FL (ref 78–100)
MONOCYTES # BLD AUTO: 0.9 10E3/UL (ref 0–1.3)
MONOCYTES NFR BLD AUTO: 10 %
NEUTROPHILS # BLD AUTO: 5.8 10E3/UL (ref 1.6–8.3)
NEUTROPHILS NFR BLD AUTO: 60 %
NONHDLC SERPL-MCNC: 93 MG/DL
PLATELET # BLD AUTO: 254 10E3/UL (ref 150–450)
POTASSIUM SERPL-SCNC: 4.3 MMOL/L (ref 3.4–5.3)
PROT SERPL-MCNC: 7 G/DL (ref 6.4–8.3)
PSA SERPL DL<=0.01 NG/ML-MCNC: 2.11 NG/ML (ref 0–3.5)
RBC # BLD AUTO: 5.68 10E6/UL (ref 4.4–5.9)
SODIUM SERPL-SCNC: 142 MMOL/L (ref 136–145)
TRIGL SERPL-MCNC: 84 MG/DL
WBC # BLD AUTO: 9.7 10E3/UL (ref 4–11)

## 2023-03-29 PROCEDURE — 80354 DRUG SCREENING FENTANYL: CPT | Performed by: INTERNAL MEDICINE

## 2023-03-29 PROCEDURE — 80360 METHYLPHENIDATE: CPT | Performed by: INTERNAL MEDICINE

## 2023-03-29 PROCEDURE — 80367 DRUG SCREENING PROPOXYPHENE: CPT | Performed by: INTERNAL MEDICINE

## 2023-03-29 PROCEDURE — 80365 DRUG SCREENING OXYCODONE: CPT | Performed by: INTERNAL MEDICINE

## 2023-03-29 PROCEDURE — G0103 PSA SCREENING: HCPCS | Performed by: INTERNAL MEDICINE

## 2023-03-29 PROCEDURE — 80373 DRUG SCREENING TRAMADOL: CPT | Performed by: INTERNAL MEDICINE

## 2023-03-29 PROCEDURE — 80324 DRUG SCREEN AMPHETAMINES 1/2: CPT | Performed by: INTERNAL MEDICINE

## 2023-03-29 PROCEDURE — 36415 COLL VENOUS BLD VENIPUNCTURE: CPT | Performed by: INTERNAL MEDICINE

## 2023-03-29 PROCEDURE — 80358 DRUG SCREENING METHADONE: CPT | Performed by: INTERNAL MEDICINE

## 2023-03-29 PROCEDURE — 99204 OFFICE O/P NEW MOD 45 MIN: CPT | Performed by: INTERNAL MEDICINE

## 2023-03-29 PROCEDURE — 80307 DRUG TEST PRSMV CHEM ANLYZR: CPT | Performed by: INTERNAL MEDICINE

## 2023-03-29 PROCEDURE — 80363 OPIOIDS & OPIATE ANALOGS 3/4: CPT | Performed by: INTERNAL MEDICINE

## 2023-03-29 PROCEDURE — 80061 LIPID PANEL: CPT | Performed by: INTERNAL MEDICINE

## 2023-03-29 PROCEDURE — 80361 OPIATES 1 OR MORE: CPT | Performed by: INTERNAL MEDICINE

## 2023-03-29 PROCEDURE — 83992 ASSAY FOR PHENCYCLIDINE: CPT | Performed by: INTERNAL MEDICINE

## 2023-03-29 PROCEDURE — 80348 DRUG SCREENING BUPRENORPHINE: CPT | Performed by: INTERNAL MEDICINE

## 2023-03-29 PROCEDURE — 80356 HEROIN METABOLITE: CPT | Performed by: INTERNAL MEDICINE

## 2023-03-29 PROCEDURE — 80359 METHYLENEDIOXYAMPHETAMINES: CPT | Performed by: INTERNAL MEDICINE

## 2023-03-29 PROCEDURE — 80053 COMPREHEN METABOLIC PANEL: CPT | Performed by: INTERNAL MEDICINE

## 2023-03-29 PROCEDURE — 80357 KETAMINE AND NORKETAMINE: CPT | Performed by: INTERNAL MEDICINE

## 2023-03-29 PROCEDURE — 80353 DRUG SCREENING COCAINE: CPT | Performed by: INTERNAL MEDICINE

## 2023-03-29 PROCEDURE — 85025 COMPLETE CBC W/AUTO DIFF WBC: CPT | Performed by: INTERNAL MEDICINE

## 2023-03-29 PROCEDURE — 80372 DRUG SCREENING TAPENTADOL: CPT | Performed by: INTERNAL MEDICINE

## 2023-03-29 PROCEDURE — 80355 GABAPENTIN NON-BLOOD: CPT | Performed by: INTERNAL MEDICINE

## 2023-03-29 PROCEDURE — 80346 BENZODIAZEPINES1-12: CPT | Performed by: INTERNAL MEDICINE

## 2023-03-29 PROCEDURE — 80366 DRUG SCREENING PREGABALIN: CPT | Performed by: INTERNAL MEDICINE

## 2023-03-29 RX ORDER — CHOLECALCIFEROL (VITAMIN D3) 125 MCG
3000 CAPSULE ORAL
Qty: 30 TABLET | Refills: 1 | Status: SHIPPED | OUTPATIENT
Start: 2023-03-29

## 2023-03-29 RX ORDER — OMEPRAZOLE 20 MG/1
40 TABLET, DELAYED RELEASE ORAL DAILY
Qty: 180 TABLET | Refills: 1 | Status: SHIPPED | OUTPATIENT
Start: 2023-03-29 | End: 2024-03-05

## 2023-03-29 RX ORDER — FAMOTIDINE 20 MG/1
20 TABLET, FILM COATED ORAL 2 TIMES DAILY
Qty: 90 TABLET | Refills: 1 | Status: SHIPPED | OUTPATIENT
Start: 2023-03-29

## 2023-03-29 RX ORDER — LORATADINE 10 MG/1
10 TABLET ORAL DAILY
COMMUNITY

## 2023-03-29 RX ORDER — FAMOTIDINE 20 MG/1
20 TABLET, FILM COATED ORAL DAILY
COMMUNITY
End: 2023-03-29

## 2023-03-29 RX ORDER — TRAMADOL HYDROCHLORIDE 50 MG/1
50 TABLET ORAL 2 TIMES DAILY PRN
COMMUNITY
End: 2023-03-29

## 2023-03-29 RX ORDER — BUSPIRONE HYDROCHLORIDE 5 MG/1
5 TABLET ORAL 3 TIMES DAILY
COMMUNITY

## 2023-03-29 RX ORDER — TRAMADOL HYDROCHLORIDE 50 MG/1
50 TABLET ORAL 2 TIMES DAILY PRN
Qty: 60 TABLET | Refills: 1 | Status: SHIPPED | OUTPATIENT
Start: 2023-03-29 | End: 2023-07-03

## 2023-03-29 ASSESSMENT — PAIN SCALES - GENERAL: PAINLEVEL: MODERATE PAIN (5)

## 2023-03-29 NOTE — LETTER
Opioid / Opioid Plus Controlled Substance Agreement    This is an agreement between you and your provider about the safe and appropriate use of controlled substance/opioids prescribed by your care team. Controlled substances are medicines that can cause physical and mental dependence (abuse).    There are strict laws about having and using these medicines. We here at [facility] are committing to working with you in your efforts to get better. To support you in this work, we ll help you schedule regular office appointments for medicine refills. If we must cancel or change your appointment for any reason, we ll make sure you have enough medicine to last until your next appointment.     As a Provider, I will:    Listen carefully to your concerns and treat you with respect.     Recommend a treatment plan that I believe is in your best interest. This plan may involve therapies other than opioid pain medication.     Talk with you often about the possible benefits, and the risk of harm of any medicine that we prescribe for you.     Provide a plan on how to taper (discontinue or go off) using this medicine if the decision is made to stop its use.    As a Patient, I understand that opioid(s):     Are a controlled substance prescribed by my care team to help me function or work and manage my condition(s).     Are strong medicines and can cause serious side effects such as:    Drowsiness, which can seriously affect my driving ability    A lower breathing rate, enough to cause death    Harm to my thinking ability     Depression     Abuse of and addiction to this medicine    Need to be taken exactly as prescribed. Combining opioids with certain medicines or chemicals (such as illegal drugs, sedatives, sleeping pills, and benzodiazepines) can be dangerous or even fatal. If I stop opioids suddenly, I may have severe withdrawal symptoms.    Do not work for all types of pain nor for all patients. If they re not helpful, I may  be asked to stop them.        The risks, benefits and side effects of these medicine(s) were explained to me. I agree that:  1. I will take part in other treatments as advised by my care team. This may be psychiatry or counseling, physical therapy, behavioral therapy, group treatment or a referral to a specialist.     2. I will keep all my appointments. I understand that this is part of the monitoring of opioids. My care team may require an office visit for EVERY opioid/controlled substance refill. If I miss appointments or don t follow instructions, my care team may stop my medicine.    3. I will take my medicines as prescribed. I will not change the dose or schedule unless my care team tells me to. There will be no refills if I run out early.     4. I may be asked to come to the clinic and complete a urine drug test or complete a pill count at any time. If I don t give a urine sample or participate in a pill count, the care team may stop my medicine.    5. I will only receive prescriptions from this clinic for chronic pain. If I am treated by another provider for acute pain issues, I will tell them that I am taking opioid pain medication for chronic pain and that I have a treatment agreement with this provider. I will inform my [facility] care team within one business day if I am given a prescription for any pain medication by another healthcare provider. My [facility] care team can contact other providers and pharmacists about my use of any medicines.    6. It is up to me to make sure that I don t run out of my medicines on weekends or holidays. If my care team is willing to refill my opioid prescription without a visit, I must request refills only during office hours. Refills may take up to 3 business days to process. I will use one pharmacy to fill all my opioid and other controlled substance prescriptions. I will notify the clinic about any changes to my insurance or medication  availability.    7. I am responsible for my prescriptions. If the medicine/prescription is lost, stolen or destroyed, it will not be replaced. I also agree not to share controlled substance medicines with anyone.    8. I am aware I should not use any illegal or recreational drugs. I agree not to drink alcohol unless my care team says I can.       9. If I enroll in the [place] [facility] Cannabis program, I will tell my care team prior to my next refill.     10. I will tell my care team right away if I become pregnant, have a new medical problem treated outside of my regular clinic, or have a change in my medications.    11. I understand that this medicine can affect my thinking, judgment and reaction time. Alcohol and drugs affect the brain and body, which can affect the safety of my driving. Being under the influence of alcohol or drugs can affect my decision-making, behaviors, personal safety, and the safety of others. Driving while impaired (DWI) can occur if a person is driving, operating, or in physical control of a car, motorcycle, boat, snowmobile, ATV, motorbike, off-road vehicle, or any other motor vehicle ([place] Statute 169A.20). I understand the risk if I choose to drive or operate any vehicle or machinery.    I understand that if I do not follow any of the conditions above, my prescriptions or treatment may be stopped or changed.          Opioids  What You Need to Know    What are opioids?   Opioids are pain medicines that must be prescribed by a doctor. They are also known as narcotics.     Examples are:   1. morphine (MS Contin, Barbi)  2. oxycodone (Oxycontin)  3. oxycodone and acetaminophen (Percocet)  4. hydrocodone and acetaminophen (Vicodin, Norco)   5. fentanyl patch (Duragesic)   6. hydromorphone (Dilaudid)   7. methadone  8. codeine (Tylenol #3)     What do opioids do well?   Opioids are best for severe short-term pain such as after a surgery or injury. They may work well for cancer pain. They may  help some people with long-lasting (chronic) pain.     What do opioids NOT do well?   Opioids never get rid of pain entirely, and they don t work well for most patients with chronic pain. Opioids don t reduce swelling, one of the causes of pain.                                    Other ways to manage chronic pain and improve function include:       Treat the health problem that may be causing pain    Anti-inflammation medicines, which reduce swelling and tenderness, such as ibuprofen (Advil, Motrin) or naproxen (Aleve)    Acetaminophen (Tylenol)    Antidepressants and anti-seizure medicines, especially for nerve pain    Topical treatments such as patches or creams    Injections or nerve blocks    Chiropractic or osteopathic treatment    Acupuncture, massage, deep breathing, meditation, visual imagery, aromatherapy    Use heat or ice at the pain site    Physical therapy     Exercise    Stop smoking    Take part in therapy       Risks and side effects     Talk to your doctor before you start or decide to keep taking opioids. Possible side effects include:      Lowering your breathing rate enough to cause death    Overdose, including death, especially if taking higher than prescribed doses    Worse depression symptoms; less pleasure in things you usually enjoy    Feeling tired or sluggish    Slower thoughts or cloudy thinking    Being more sensitive to pain over time; pain is harder to control    Trouble sleeping or restless sleep    Changes in hormone levels (for example, less testosterone)    Changes in sex drive or ability to have sex    Constipation    Unsafe driving    Itching and sweating    Dizziness    Nausea, throwing up and dry mouth    What else should I know about opioids?    Opioids may lead to dependence, tolerance, or addiction.      Dependence means that if you stop or reduce the medicine too quickly, you will have withdrawal symptoms. These include loose poop (diarrhea), jitters, flu-like symptoms,  nervousness and tremors. Dependence is not the same as addiction.                       Tolerance means needing higher doses over time to get the same effect. This may increase the chance of serious side effects.      Addiction is when people improperly use a substance that harms their body, their mind or their relations with others. Use of opiates can cause a relapse of addiction if you have a history of drug or alcohol abuse.      People who have used opioids for a long time may have a lower quality of life, worse depression, higher levels of pain and more visits to doctors.    You can overdose on opioids. Take these steps to lower your risk of overdose:    1. Recognize the signs:  Signs of overdose include decrease or loss of consciousness (blackout), slowed breathing, trouble waking up and blue lips. If someone is worried about overdose, they should call 911.    2. Talk to your doctor about Narcan (naloxone).   If you are at risk for overdose, you may be given a prescription for Narcan. This medicine very quickly reverses the effects of opioids.   If you overdose, a friend or family member can give you Narcan while waiting for the ambulance. They need to know the signs of overdose and how to give Narcan.     3. Don't use alcohol or street drugs.   Taking them with opioids can cause death.    4. Do not take any of these medicines unless your doctor says it s OK. Taking these with opioids can cause death:    Benzodiazepines, such as lorazepam (Ativan), alprazolam (Xanax) or diazepam (Valium)    Muscle relaxers, such as cyclobenzaprine (Flexeril)    Sleeping pills like zolpidem (Ambien)     Other opioids      How to keep you and other people safe while taking opioids:    1. Never share your opioids with others.  Opioid medicines are regulated by the Drug Enforcement Agency (BRITTNI). Selling or sharing medications is a criminal act.    2. Be sure to store opioids in a secure place, locked up if possible. Young children  can easily swallow them and overdose.    3. When you are traveling with your medicines, keep them in the original bottles. If you use a pill box, be sure you also carry a copy of your medicine list from your clinic or pharmacy.    4. Safe disposal of opioids    Most pharmacies have places to get rid of medicine, called disposal kiosks. Medicine disposal options are also available in every [facility]. Search your county and  medication disposal  to find more options. You can find more details at:  [email or web address]     I agree that my provider, clinic care team, and pharmacy may work with any city, state or federal law enforcement agency that investigates the misuse, sale, or other diversion of my controlled medicine. I will allow my provider to discuss my care with, or share a copy of, this agreement with any other treating provider, pharmacy or emergency room where I receive care.    I have read this agreement and have asked questions about anything I did not understand.    _______________________________________________________  Patient Signature - Zaheer Mcgee _____________________                   Date     _______________________________________________________  Provider Signature - Avila Rivera,    _____________________                   Date     _______________________________________________________  Witness Signature (required if provider not present while patient signing)   _____________________                   Date

## 2023-04-08 ENCOUNTER — HEALTH MAINTENANCE LETTER (OUTPATIENT)
Age: 53
End: 2023-04-08

## 2023-04-10 ENCOUNTER — DOCUMENTATION ONLY (OUTPATIENT)
Dept: GASTROENTEROLOGY | Facility: CLINIC | Age: 53
End: 2023-04-10
Payer: COMMERCIAL

## 2023-04-10 NOTE — PROGRESS NOTES
Colorectal Cancer Screening Results  External result received from: Jacqueline  Date of Procedure: 3/9/2022    Health Maintenance updated based on provider recommendations/ASGE Guidelines.      Simona Alamo RN on 4/10/2023 at 9:48 AM

## 2023-05-24 DIAGNOSIS — H35.713 CENTRAL SEROUS CHORIORETINOPATHY OF BOTH EYES: Primary | ICD-10-CM

## 2023-06-08 ENCOUNTER — OFFICE VISIT (OUTPATIENT)
Dept: OPHTHALMOLOGY | Facility: CLINIC | Age: 53
End: 2023-06-08
Attending: OPHTHALMOLOGY
Payer: COMMERCIAL

## 2023-06-08 DIAGNOSIS — H35.713 CENTRAL SEROUS CHORIORETINOPATHY OF BOTH EYES: ICD-10-CM

## 2023-06-08 DIAGNOSIS — H40.003 GLAUCOMA SUSPECT OF BOTH EYES: Primary | ICD-10-CM

## 2023-06-08 PROCEDURE — 99214 OFFICE O/P EST MOD 30 MIN: CPT | Performed by: OPHTHALMOLOGY

## 2023-06-08 PROCEDURE — G0463 HOSPITAL OUTPT CLINIC VISIT: HCPCS | Performed by: OPHTHALMOLOGY

## 2023-06-08 PROCEDURE — 92134 CPTRZ OPH DX IMG PST SGM RTA: CPT | Performed by: OPHTHALMOLOGY

## 2023-06-08 PROCEDURE — 92083 EXTENDED VISUAL FIELD XM: CPT | Performed by: OPHTHALMOLOGY

## 2023-06-08 ASSESSMENT — REFRACTION_WEARINGRX
OD_AXIS: 112
OD_CYLINDER: +4.25
OS_ADD: +2.25
OD_ADD: +2.25
OD_SPHERE: -0.25
OS_CYLINDER: +1.50
OS_AXIS: 007
OS_SPHERE: -0.75

## 2023-06-08 ASSESSMENT — CUP TO DISC RATIO
OD_RATIO: 0.65
OS_RATIO: 0.65

## 2023-06-08 ASSESSMENT — VISUAL ACUITY
METHOD: SNELLEN - LINEAR
OD_CC+: -2
CORRECTION_TYPE: GLASSES
OS_CC+: -1
OD_CC: 20/20
OS_CC: 20/30

## 2023-06-08 ASSESSMENT — TONOMETRY
IOP_METHOD: TONOPEN
OD_IOP_MMHG: 19
OS_IOP_MMHG: 18

## 2023-06-08 ASSESSMENT — EXTERNAL EXAM - LEFT EYE: OS_EXAM: NORMAL

## 2023-06-08 ASSESSMENT — EXTERNAL EXAM - RIGHT EYE: OD_EXAM: NORMAL

## 2023-06-08 NOTE — NURSING NOTE
Patient reports increase in tearing , both eyes. First started 3 weeks ago. Also reports foreign body sensation occasionally, more so the right eye than left eye.   Denies floaters/flashes. Is not using any eye drops at this time.       NIKKIE HUSTON, June 8, 2023

## 2023-06-08 NOTE — PROGRESS NOTES
CC: follow up of  and glaucoma evaluation    Interval History Jun 8, 2023: LCV 2/8/2023. No acute changes in vision, no flashes and floaters. Feels vision in left eye is blurry some days in left eye.     HPI: Zaheer Mcgee is a 53 year old gentleman who has a history of vision loss in the left eye since 2006.  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  There were no cystic changes.    LEFT EYE:  good foveal contour. Stable appearance from last OCT--disruption of the IS/OS junction and retinal pigment epithelium. Thick choroid. Small shallow pigment epithelial detachment - stable    OVF 24-2 06/09/23   OD: MD 0.8 reliable, normal    OS: MD 0.5 reliable; paracentral spot of decreased sensitivity    OCT RNFL 03/09/23   OD: normal thickness throughout  OS: temporal borderline thinning  Could be secondary to history of Central serous retinopathy?    AF 02/04/22 :   Right eye: no abnormalities  Left eye: Retinal pigment epithelium atrophy with surrounding hyperfluorescence.     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  Differential diagnosis: other causes of maculopathy. No progression  No obvious choroidal lesion, diffuse Retinal pigment epithelium changes  No history of plaquenil intake  Used Flonase nasal spray rarely - hasn't used in years  Patient with history of sleep apnea- diagnosed 2 ys ago. Uses CPAP  Thick choroid left eye > right eye    Stable distortion   Stable exam and Optical Coherence Tomography. Pigment epithelial detachment without subretinal fluid.   observe   Continue using Amsler grid    Stop caffeine intake - drinks coffee daily 1 travel mug (24oz)  Stable     # Cataract, both eyes   becoming visually significant    monitor       # glaucoma suspect OU  - Based in increased c/d ratio   - Tmax 22/21  - IOP today 03/09/23 18/18  - FH: Mother with glaucoma (possibly)  - History of  taking steroids   - Pachy: 513/544  - Last HVF 03/09/23: new superior arcuate right eye- improved after removing mask and repeated testing; OS normal (first time visual field - reliable OU)  - Last OCT RNFL 03/09/23: OD normal, OS temporal borderline thinning  - Hx of medication intolerance: none  - Hx of kidney stones or asthma: no  - Personal/family hx sickle cell: no  - Hx eye trauma: none  - Gonio: Open to  OU  -RNFL without thinning right eye; repeated visual field right eye showed improvement.  OS VF normal and RNFL thinning likely related to  related atrophy rather than glaucomatous change so will not start drops.    # History of Chiari I malformation based on MRI findings    Follow up with retina 4 months with BAT, dilation and macula Optical Coherence Tomography; with ultrasound both eyes to assess choroid  And prescription   ~~~~~~~~~~~~~~~~~~~~~~~~~~~~~~~~~~   Complete documentation of historical and exam elements from today's encounter can be found in the full encounter summary report (not reduplicated in this progress note).  I personally obtained the chief complaint(s) and history of present illness.  I confirmed and edited as necessary the review of systems, past medical/surgical history, family history, social history, and examination findings as documented by others; and I examined the patient myself.  I personally reviewed the relevant tests, images, and reports as documented above.  I personally reviewed the ophthalmic test(s) associated with this encounter, agree with the interpretation(s) as documented by the resident/fellow, and have edited the corresponding report(s) as necessary.   I formulated and edited as necessary the assessment and plan and discussed the findings and management plan with the patient and family    Diana Patrick MD   of Ophthalmology.  Retina Service   Department of Ophthalmology and Visual Neurosciences   [facility]  Phone:  [phone]   Fax: [phone]

## 2023-06-30 DIAGNOSIS — G89.29 OTHER CHRONIC PAIN: ICD-10-CM

## 2023-06-30 NOTE — TELEPHONE ENCOUNTER
Tramadol 50 mg      Last Written Prescription Date:  3/29/23  Last Fill Quantity: 60,   # refills: 1  Last Office Visit: 3/29/23  Future Office visit:       Routing refill request to provider for review/approval because:  Drug not on the [facility], P or [facility] refill protocol or controlled substance

## 2023-07-03 RX ORDER — TRAMADOL HYDROCHLORIDE 50 MG/1
TABLET ORAL
Qty: 60 TABLET | Refills: 0 | Status: SHIPPED | OUTPATIENT
Start: 2023-07-03 | End: 2023-08-15

## 2023-08-15 DIAGNOSIS — G89.29 OTHER CHRONIC PAIN: ICD-10-CM

## 2023-08-15 RX ORDER — TRAMADOL HYDROCHLORIDE 50 MG/1
TABLET ORAL
Qty: 60 TABLET | Refills: 0 | Status: SHIPPED | OUTPATIENT
Start: 2023-08-15 | End: 2023-10-02

## 2023-08-15 NOTE — TELEPHONE ENCOUNTER
Ultram      Last Written Prescription Date:  7.3.23  Last Fill Quantity: #60,   # refills: 0  Last Office Visit: 3.29.23  Future Office visit:       Routing refill request to provider for review/approval because:  Drug not on the [facility], P or [facility] refill protocol or controlled substance

## 2023-09-27 ENCOUNTER — OFFICE VISIT (OUTPATIENT)
Dept: INTERNAL MEDICINE | Facility: OTHER | Age: 53
End: 2023-09-27
Attending: INTERNAL MEDICINE
Payer: MEDICAID

## 2023-09-27 VITALS
TEMPERATURE: 98.5 F | DIASTOLIC BLOOD PRESSURE: 76 MMHG | HEART RATE: 88 BPM | WEIGHT: 183.5 LBS | SYSTOLIC BLOOD PRESSURE: 112 MMHG | OXYGEN SATURATION: 96 %

## 2023-09-27 DIAGNOSIS — G89.29 OTHER CHRONIC PAIN: ICD-10-CM

## 2023-09-27 DIAGNOSIS — J30.2 SEASONAL ALLERGIC RHINITIS, UNSPECIFIED TRIGGER: ICD-10-CM

## 2023-09-27 DIAGNOSIS — B07.8 COMMON WART: ICD-10-CM

## 2023-09-27 DIAGNOSIS — L84 CALLUS OF FOOT: Primary | ICD-10-CM

## 2023-09-27 PROCEDURE — G0463 HOSPITAL OUTPT CLINIC VISIT: HCPCS

## 2023-09-27 PROCEDURE — 99213 OFFICE O/P EST LOW 20 MIN: CPT | Performed by: INTERNAL MEDICINE

## 2023-09-27 ASSESSMENT — PAIN SCALES - GENERAL: PAINLEVEL: MODERATE PAIN (4)

## 2023-09-27 NOTE — PROGRESS NOTES
Assessment & Plan   Problem List Items Addressed This Visit    None  Visit Diagnoses       Callus of foot    -  Primary    Relevant Orders    Orthopedic  Referral    Seasonal allergic rhinitis, unspecified trigger        Relevant Orders    Allergen cat epithellium IgE    Allergen dog epithelium IgE    Allergen Gilbert grass IgE    Allergen orchard grass IgE    Allergen maciej IgE    Allergen D farinae IgE    Allergen D pteronyssinus IgE    Allergen alternaria alternata IgE    Allergen aspergillus fumigatus IgE    Allergen cladosporium herbarum IgE    Allergen Epicoccum purpurascens IgE    Allergen penicillium notatum IgE    Allergen carmencita white IgE    Allergen Cedar IgE    Allergen cottonwood IgE    Allergen elm IgE    Allergen maple box elder IgE    Allergen oak white IgE    Allergen Red Farnham IgE    Allergen silver  birch IgE    Allergen Tree White Farnham IgE    Allergen Dobbs Ferry Tree    Allergen white pine IgE    Allergen English plantain IgE    Allergen giant ragweed IgE    Allergen lamb's quarter IgE    Allergen Mugwort IgE    Allergen ragweed short IgE    Allergen Sagebrush Wormwood IgE    Allergen Sheep Sorrel IgE    Allergen thistle Russian IgE    Allergen Weed Nettle IgE    Allergen, Kochia/Firebush    Common wart               Liquid nitrogen was applied to his left thumb 3 times during this clinic visit.  Patient will let us know if this improves or needs repeat treatment.      20 minutes spent by me on the date of the encounter doing chart review, review of outside records, review of test results, interpretation of tests, patient visit, and documentation            No follow-ups on file.    Avila Rivera, [facility]    Jacinda Gama is a 53 year old, presenting for the following health issues:  Derm Problem      HPI     Natan presents today due to a couple concerns.  #1 he states that on his right foot at the metatarsal head of the right first toe and  plantar aspect of the foot he has a rather large callus.  He states that this callus has been shaved down previously by podiatry however it has come back.  He states that it is painful to walk on.  In examination he certainly has a callus however there may be an underlying common wart in addition.    His other concern is the fact that he has he thinks is a callus on his left thumb.  He states that this has been here quite a while.  He denies any real pain with this however it is irritating to him when grasping items.  It does appear consistent with a common wart.    Lastly Natan has a history of severe seasonal allergies.  He continues on Claritin 10 mg daily.  He has a history of RAST testing I believe however he was taking some antihistamines at the time of the test.  Patient would like to have repeat RAST testing completed.  He continues to have rhinitis and sneezing associated with his allergies.      Concern - Spot on left thumb and spot bottom of right foot below great toe  Onset: Approx 10 years  Description: calloused areas  Intensity: moderate  Progression of Symptoms:  worsening  Accompanying Signs & Symptoms: pain with weight bearing or when grabbing something  Previous history of similar problem: ongoing  Precipitating factors:        Worsened by: weight bearing and grasping  Alleviating factors:        Improved by: None  Therapies tried and outcome: Has had them shaved off in the past.  Advised to use antiperspirant and medicated cream per podiatry.    Requesting referral for hearing aides    Requesting allergy testing       Review of Systems   Constitutional, HEENT, cardiovascular, pulmonary, gi and gu systems are negative, except as otherwise noted.      Objective    /76 (BP Location: Left arm, Patient Position: Sitting, Cuff Size: Adult Regular)   Pulse 88   Temp 98.5  F (36.9  C)   Wt 83.2 kg (183 lb 8 oz)   SpO2 96%   There is no height or weight on file to calculate BMI.  Physical Exam    GENERAL: healthy, alert and no distress  MS: Right foot with large callus on the plantar aspect of the forefoot and base of the first metatarsal.  SKIN: Large callus and possible common wart at the base of the first metatarsal head of the right foot/forefoot.  Left thumb with a common wart on the grasping aspect of his left thumb.  PSYCH: mentation appears normal, affect normal/bright    Office Visit on 03/29/2023   Component Date Value Ref Range Status    Sodium 03/29/2023 142  136 - 145 mmol/L Final    Potassium 03/29/2023 4.3  3.4 - 5.3 mmol/L Final    Chloride 03/29/2023 104  98 - 107 mmol/L Final    Carbon Dioxide (CO2) 03/29/2023 29  22 - 29 mmol/L Final    Anion Gap 03/29/2023 9  7 - 15 mmol/L Final    Urea Nitrogen 03/29/2023 14.2  6.0 - 20.0 mg/dL Final    Creatinine 03/29/2023 0.79  0.67 - 1.17 mg/dL Final    Calcium 03/29/2023 9.3  8.6 - 10.0 mg/dL Final    Glucose 03/29/2023 96  70 - 99 mg/dL Final    Alkaline Phosphatase 03/29/2023 74  40 - 129 U/L Final    AST 03/29/2023 22  10 - 50 U/L Final    ALT 03/29/2023 38  10 - 50 U/L Final    Protein Total 03/29/2023 7.0  6.4 - 8.3 g/dL Final    Albumin 03/29/2023 4.3  3.5 - 5.2 g/dL Final    Bilirubin Total 03/29/2023 1.2  <=1.2 mg/dL Final    GFR Estimate 03/29/2023 >90  >60 mL/min/1.73m2 Final    eGFR calculated using 2021 CKD-EPI equation.    Cholesterol 03/29/2023 145  <200 mg/dL Final    Triglycerides 03/29/2023 84  <150 mg/dL Final    Direct Measure HDL 03/29/2023 52  >=40 mg/dL Final    LDL Cholesterol Calculated 03/29/2023 76  <=100 mg/dL Final    Non HDL Cholesterol 03/29/2023 93  <130 mg/dL Final    Prostate Specific Antigen Screen 03/29/2023 2.11  0.00 - 3.50 ng/mL Final    WBC Count 03/29/2023 9.7  4.0 - 11.0 10e3/uL Final    RBC Count 03/29/2023 5.68  4.40 - 5.90 10e6/uL Final    Hemoglobin 03/29/2023 16.7  13.3 - 17.7 g/dL Final    Hematocrit 03/29/2023 50.1  40.0 - 53.0 % Final    MCV 03/29/2023 88  78 - 100 fL Final    MCH 03/29/2023 29.4   26.5 - 33.0 pg Final    MCHC 03/29/2023 33.3  31.5 - 36.5 g/dL Final    RDW 03/29/2023 13.6  10.0 - 15.0 % Final    Platelet Count 03/29/2023 254  150 - 450 10e3/uL Final    % Neutrophils 03/29/2023 60  % Final    % Lymphocytes 03/29/2023 27  % Final    % Monocytes 03/29/2023 10  % Final    % Eosinophils 03/29/2023 3  % Final    % Basophils 03/29/2023 1  % Final    Absolute Neutrophils 03/29/2023 5.8  1.6 - 8.3 10e3/uL Final    Absolute Lymphocytes 03/29/2023 2.6  0.8 - 5.3 10e3/uL Final    Absolute Monocytes 03/29/2023 0.9  0.0 - 1.3 10e3/uL Final    Absolute Eosinophils 03/29/2023 0.3  0.0 - 0.7 10e3/uL Final    Absolute Basophils 03/29/2023 0.1  0.0 - 0.2 10e3/uL Final    Creatinine Urine for Drug Screen 03/29/2023 20  mg/dL Final    The reference range has not been established for creatinine in random urines. The results should be integrated into the clinical context for interpretation.    Cannabinoids Urine 03/29/2023 Screen Negative  Screen Negative Final    Cutoff for a negative cannabinoid is less than 50 ng/mL.     No results found for any visits on 09/27/23.  No results found for this or any previous visit (from the past 24 hour(s)).

## 2023-09-28 ENCOUNTER — TELEPHONE (OUTPATIENT)
Dept: INTERNAL MEDICINE | Facility: OTHER | Age: 53
End: 2023-09-28

## 2023-09-28 ENCOUNTER — OFFICE VISIT (OUTPATIENT)
Dept: AUDIOLOGY | Facility: OTHER | Age: 53
End: 2023-09-28
Attending: AUDIOLOGIST
Payer: MEDICAID

## 2023-09-28 DIAGNOSIS — H90.6 MIXED CONDUCTIVE AND SENSORINEURAL HEARING LOSS OF BOTH EARS: Primary | ICD-10-CM

## 2023-09-28 DIAGNOSIS — H90.3 SENSORINEURAL HEARING LOSS, ASYMMETRICAL: ICD-10-CM

## 2023-09-28 DIAGNOSIS — H93.13 TINNITUS, BILATERAL: ICD-10-CM

## 2023-09-28 PROCEDURE — 92557 COMPREHENSIVE HEARING TEST: CPT | Performed by: AUDIOLOGIST

## 2023-09-28 PROCEDURE — 92550 TYMPANOMETRY & REFLEX THRESH: CPT | Performed by: AUDIOLOGIST

## 2023-09-28 NOTE — TELEPHONE ENCOUNTER
8:30 AM    Reason for Call: Phone Call    Description: Want referral for hearing test. Forgot to ask during appt on 9/27.     Was an appointment offered for this call? No  If yes : Appointment type              Date    Preferred method for responding to this message: Telephone Call  What is your phone number [phone]    If we cannot reach you directly, may we leave a detailed response at the number you provided? Yes    Can this message wait until your PCP/provider returns, if available today? Not applicable    Alyssa Ma

## 2023-09-28 NOTE — PROGRESS NOTES
Audiology Evaluation Completed. Please refer SCANNED AUDIOGRAM and/or TYMPANOGRAM for BACKGROUND, RESULTS, RECOMMENDATIONS.      Loretta LOONEY, [facility]  Audiologist #[id number]

## 2023-09-29 NOTE — TELEPHONE ENCOUNTER
Ultram      Last Written Prescription Date:  8.15.23  Last Fill Quantity: #60,   # refills: 0  Last Office Visit: 9.27.23  Future Office visit:    Next 5 appointments (look out 90 days)      Nov 06, 2023  8:00 AM  Nurse Only with Alyssa Lombardi RN  [facility] ([facility] ) [address] [place] [id number]  [phone]     Nov 06, 2023  8:30 AM  Dept. of Transportation with Colton Driver DC  [facility] [place] ([facility] ) [address] [place] [id number]  [phone]             Routing refill request to provider for review/approval because:  Drug not on the [facility], [facility] or [facility] refill protocol or controlled substance

## 2023-10-02 ENCOUNTER — TELEPHONE (OUTPATIENT)
Dept: INTERNAL MEDICINE | Facility: OTHER | Age: 53
End: 2023-10-02

## 2023-10-02 ENCOUNTER — OFFICE VISIT (OUTPATIENT)
Dept: PODIATRY | Facility: OTHER | Age: 53
End: 2023-10-02
Attending: PODIATRIST
Payer: COMMERCIAL

## 2023-10-02 VITALS
SYSTOLIC BLOOD PRESSURE: 117 MMHG | OXYGEN SATURATION: 97 % | HEART RATE: 81 BPM | TEMPERATURE: 98.3 F | DIASTOLIC BLOOD PRESSURE: 75 MMHG

## 2023-10-02 DIAGNOSIS — M20.41 ACQUIRED HAMMER TOE DEFORMITY OF LESSER TOE OF BOTH FEET: ICD-10-CM

## 2023-10-02 DIAGNOSIS — L84 CALLUS OF FOOT: Primary | ICD-10-CM

## 2023-10-02 DIAGNOSIS — M20.42 ACQUIRED HAMMER TOE DEFORMITY OF LESSER TOE OF BOTH FEET: ICD-10-CM

## 2023-10-02 DIAGNOSIS — G47.33 OSA (OBSTRUCTIVE SLEEP APNEA): Primary | ICD-10-CM

## 2023-10-02 PROCEDURE — G0463 HOSPITAL OUTPT CLINIC VISIT: HCPCS

## 2023-10-02 PROCEDURE — 11056 PARNG/CUTG B9 HYPRKR LES 2-4: CPT | Performed by: PODIATRIST

## 2023-10-02 PROCEDURE — 99203 OFFICE O/P NEW LOW 30 MIN: CPT | Mod: 25 | Performed by: PODIATRIST

## 2023-10-02 PROCEDURE — G0463 HOSPITAL OUTPT CLINIC VISIT: HCPCS | Mod: 25

## 2023-10-02 RX ORDER — AMMONIUM LACTATE 12 G/100G
CREAM TOPICAL 2 TIMES DAILY
Qty: 385 G | Refills: 3 | Status: SHIPPED | OUTPATIENT
Start: 2023-10-02

## 2023-10-02 RX ORDER — TRAMADOL HYDROCHLORIDE 50 MG/1
TABLET ORAL
Qty: 60 TABLET | Refills: 0 | Status: SHIPPED | OUTPATIENT
Start: 2023-10-02 | End: 2023-11-06

## 2023-10-02 ASSESSMENT — PAIN SCALES - GENERAL: PAINLEVEL: MODERATE PAIN (5)

## 2023-10-02 NOTE — TELEPHONE ENCOUNTER
Call returned to patient, notified wait until blister dries up and scabs off before any further treatment per Dr. Rivera. Patient verbalized understanding.     Avila Rivera, DO23 minutes ago (3:21 PM)     AB  I would not treat it for several weeks, after blister dries up and scab comes off-maybe a month

## 2023-10-02 NOTE — PROGRESS NOTES
Chief complaint: Patient presents with:  Musculoskeletal Problem: callus      History of Present Illness: This 53 year old male is seen for evaluation and suggestions of management of a painful callu on the RIGHT plantar foot. The callus has been present for years, but it worsened in the last couple of months. He was previously living in [place] and he says another podiatrist, Dr. Iraheta (at [facility]), gave him an anti-fungal for his toenails as well as a cream for his calluses. She previously pared his calluses, advised him to use antiperspirant spray on his feet, Aluminum chloride to stop the sweaty feet, antifungal creams (Terbinafine) in the evenings, and Urea Cream 20% on the calluses. He does not think the Urea cream worked a lot on his feet. The callus is giving him pain and he would like it treated today. He says he was having it taken down around once a year. He also has calluses on the bilateral hallux and he would like those pared today as well.    The antiperspirant spray does reduce the sweating of his feet.     He is also complaining of rubbing on the tops of his toes. He says they have curled more over the years and they are causing him discomfort. This also makes his toenails curl over his toes. He says the toes seem to have curled more in the last year. He would like to discuss treatment options for the hammertoes and how to limit the rubbing on his toes.    No further pedal complaints today.         /75 (BP Location: Left arm, Patient Position: Sitting, Cuff Size: Adult Regular)   Pulse 81   Temp 98.3  F (36.8  C) (Tympanic)   SpO2 97%     Patient Active Problem List   Diagnosis    Central serous retinopathy    Cataract       Past Surgical History:   Procedure Laterality Date    COLONOSCOPY - HIM SCAN N/A 03/01/2008    Colonoscopy lesion remove  3/2008    COLONOSCOPY - HIM SCAN N/A 03/09/2017    Colonoscopy screening 5 year recall due 3/2022    groin surgery   01/01/1999    STRABISMUS SURGERY Right 01/01/1972       Current Outpatient Medications   Medication    Acetaminophen 325 MG CAPS    busPIRone (BUSPAR) 5 MG tablet    famotidine (PEPCID) 20 MG tablet    loratadine (CLARITIN) 10 MG tablet    omeprazole (PRILOSEC OTC) 20 MG EC tablet    traMADol (ULTRAM) 50 MG tablet    lactase (LACTAID) 3000 UNIT tablet     No current facility-administered medications for this visit.        No Known Allergies    Family History   Problem Relation Age of Onset    Glaucoma Mother     Diabetes Maternal Grandmother        Social History     Socioeconomic History    Marital status:      Spouse name: None    Number of children: None    Years of education: None    Highest education level: None   Tobacco Use    Smoking status: Former     Packs/day: 2.00     Types: Cigarettes    Smokeless tobacco: Never     Social Determinants of Health     Financial Resource Strain: Low Risk  (9/27/2023)    Financial Resource Strain     Within the past 12 months, have you or your family members you live with been unable to get utilities (heat, electricity) when it was really needed?: No   Food Insecurity: Low Risk  (9/27/2023)    Food Insecurity     Within the past 12 months, did you worry that your food would run out before you got money to buy more?: No     Within the past 12 months, did the food you bought just not last and you didn t have money to get more?: No   Transportation Needs: Low Risk  (9/27/2023)    Transportation Needs     Within the past 12 months, has lack of transportation kept you from medical appointments, getting your medicines, non-medical meetings or appointments, work, or from getting things that you need?: No   Interpersonal Safety: Low Risk  (9/27/2023)    Interpersonal Safety     Do you feel physically and emotionally safe where you currently live?: Yes     Within the past 12 months, have you been hit, slapped, kicked or otherwise physically hurt by someone?: No     Within  the past 12 months, have you been humiliated or emotionally abused in other ways by your partner or ex-partner?: No   Housing Stability: Low Risk  (9/27/2023)    Housing Stability     Do you have housing? : Yes     Are you worried about losing your housing?: No       ROS: 10 point ROS neg other than the symptoms noted above in the HPI.  EXAM  Constitutional: healthy, alert, and no distress    Psychiatric: mentation appears normal and affect normal/bright    VASCULAR:  -Dorsalis pedis pulse +2/4 b/l  -Posterior tibial pulse +2/4 b/l  -Capillary refill time < 3 seconds to b/l hallux  NEURO:  -Light touch sensation intact to b/l plantar forefoot  DERM:  -Skin temperature, texture and turgor WNL b/l  -Moderate hyperkeratotic lesion on the RIGHT plantar 1st metatarsal head  -Hyperkeratotic lesion on the medial plantar distal aspect of the proximal phalanx of the bilateral hallux  MSK:  -Pain on palpation to the RIGHT plantar 1st metatarsal head  -DORSIFLEXION contracture to MTPJ 2-5 b/l with flexion contracture to PIPJ of digits 2-5 b/l  -Muscle strength of ankles +5/5 for dorsiflexion, plantarflexion, ABDUction and ADDuction b/l    ============================================================    ASSESSMENT:  (L84) Callus of foot  (primary encounter diagnosis)    (M20.41,  M20.42) Acquired hammer toe deformity of lesser toe of both feet        PLAN:  -Patient evaluated and examined. Treatment options discussed with no educational barriers noted.  -  -Callus pared x 3 to the medial plantar distal aspect of the proximal phalanx of the bilateral hallux and the RIGHT plantar first metatarsal head without incident  ---Patient reminded that the callus will likely return due to the underlying, prominent bone causing the callus while the patient is walking.    Calluses on the bottom surface of the foot will continue to come back due to the pressure from the bone on the bottom of your foot. Below are some tips for keeping the  callus(es) trimmed which often decreases the pain on the bottom of your foot.    -Callus care:  ---Do a daily epsom salt soak in lukewarm water for 20 minutes.   ---After the soak, the apply a moisturizing cream (ammonium lactate) to the callus and let it soak in for about ten minutes  ---Next, take an sarita board or nail file to or pumice stone the callus. This should be done daily (minimally lotion and callus paring) to keep the callus well pared.    -Ammonium Lactate will be ordered through your pharmacy today  -Orthotics / shoe inserts can be modified to take the pressure off the calluses which can decrease the rate a which they develop. You will be called within two weeks to schedule this appointment, or you can call the number on the card to schedule the appointment.    -Patient may use antiperspirant spray if he has excessive sweating, but limit use if his feet become too dry and cracked this winter.  ------------------------    -Hammertoes:  ---Discussed etiology and treatment options for hammertoes.  ---Discussed how this deformity can progress and what can be done to treat the deformity. Biomechanics such as flat feet, a tight Achilles tendons, pronation, supination, and other factors can lead to the formation of hammertoes as tendons become stronger on one side of the toe over the other side of the toe.  ---Conservative treatment options consist of wider shoe gear / shoes with more depth, and padding/splinting to accommodate the painful toe (such as toe sleeves or crest pads).  --Size small silicone toe sleeves can reduce rubbing on the tops and tips of the toes. Patient [facility] these at [facility] or [facility]. Patient should not wear the toe sleeve(s) at night, but only wear the sleeve in shoes and/or socks during the day. The sleeves are re-usable and hand washable until they wear out.    ---Discussed surgical treatment options including risks and benefits and post-op periods. Hammertoes can be straightened  with an implant and/or K-wire. A K-wire is sometimes needed to hold the toe in a plantarflexed position so it does not lift at the MTPJ. The wire may need to be in the foot for up to six weeks and requires offloading of the surgical foot. Patient cannot drive if the surgery is on the RIGHT foot until the pins are removed.     ---At this time, patient would like to proceed with orthotics with a metatarsal pad to reduce the pressure on the ball of the foot. This may also limit further progression of the hammertoes. He will also consider a toe sleeve. He may find these at [facility] or [facility]. Patient should not wear the toe sleeve(s) at night, but only wear the sleeve in shoes and/or socks during the day. The sleeves are re-usable and hand washable until they wear out.  Patient will call if the toe pain worsens.   -This is an acute, uncomplicated illness/injury with OTC treatment options and surgical treatment options reviewed.      -Patient in agreement with the above treatment plan and all of patient's questions were answered.      Return to clinic as needed per patient request        Hollie Nina DPM

## 2023-10-02 NOTE — CONFIDENTIAL NOTE
I would not treat it for several weeks, after blister dries up and scab comes off-maybe a month

## 2023-10-02 NOTE — PATIENT INSTRUCTIONS
---Consider looking for a size small toe sleeve. You [facility] find these at [facility] or [facility]. Patient should not wear the toe sleeve(s) at night, but only wear the sleeve in shoes and/or socks during the day. The sleeves are re-usable and hand washable until they wear out.

## 2023-10-02 NOTE — TELEPHONE ENCOUNTER
4:21 PM    Reason for Call: Phone Call    Description: Patient called stating he needs an order for CPAP supplies to go to [facility] fax number [phone]    Was an appointment offered for this call? No  If yes : Appointment type              Date    Preferred method for responding to this message: Telephone Call  What is your phone number ? [phone]    If we cannot reach you directly, may we leave a detailed response at the number you provided? Yes    Can this message wait until your PCP/provider returns, if available today? YES, provider is in  Sammie Mattson

## 2023-10-02 NOTE — TELEPHONE ENCOUNTER
9:56 AM    Reason for Call: Phone Call    Description: Patient called requesting a call from provider or nurse regarding a left thumb wart that he had frozen off 9/27/23 but he needs to when he should rerturn    Was an appointment offered for this call? No  If yes : Appointment type              Date    Preferred method for responding to this message: Telephone Call  What is your phone number ? [phone]    If we cannot reach you directly, may we leave a detailed response at the number you provided? Yes    Can this message wait until your PCP/provider returns, if available today? YES, provider is in    Sammie Mattson

## 2023-10-02 NOTE — PATIENT INSTRUCTIONS
Complete MRI of inner ear  Follow up with Audiology for Hearing aid consult  Hearing protection.   Annual audiogram.     Thank you for allowing YASMINE James and our ENT team to participate in your care.  If your medications are too expensive, please give the nurse a call.  We can possibly change this medication.  If you have a scheduling or an appointment question please contact our Health Unit Coordinator at their direct line [phone].   ALL nursing questions or concerns can be directed to your ENT nurse, Sandee at: [phone].

## 2023-10-02 NOTE — TELEPHONE ENCOUNTER
Call returned to patient. Patient reporting he is in need of another appointment for repeat treatment.     Patient also reporting a blister developed on thumb after the previous tx on 9/27/23.     Patient inquiring if he should return to Dr. Rivera for another treatment or if there was somewhere in [facility] patient can go to. Notified will discuss with Dr. Rivera and return call to patient with an update.

## 2023-10-03 ENCOUNTER — OFFICE VISIT (OUTPATIENT)
Dept: OTOLARYNGOLOGY | Facility: OTHER | Age: 53
End: 2023-10-03
Attending: PHYSICIAN ASSISTANT
Payer: COMMERCIAL

## 2023-10-03 ENCOUNTER — MEDICAL CORRESPONDENCE (OUTPATIENT)
Dept: HEALTH INFORMATION MANAGEMENT | Facility: HOSPITAL | Age: 53
End: 2023-10-03

## 2023-10-03 VITALS
HEIGHT: 67 IN | OXYGEN SATURATION: 97 % | SYSTOLIC BLOOD PRESSURE: 110 MMHG | HEART RATE: 83 BPM | TEMPERATURE: 98.1 F | WEIGHT: 180 LBS | BODY MASS INDEX: 28.25 KG/M2 | DIASTOLIC BLOOD PRESSURE: 68 MMHG

## 2023-10-03 DIAGNOSIS — G47.33 OSA (OBSTRUCTIVE SLEEP APNEA): Primary | ICD-10-CM

## 2023-10-03 DIAGNOSIS — H90.3 ASNHL (ASYMMETRICAL SENSORINEURAL HEARING LOSS): Primary | ICD-10-CM

## 2023-10-03 DIAGNOSIS — H93.13 TINNITUS, BILATERAL: ICD-10-CM

## 2023-10-03 PROCEDURE — 92504 EAR MICROSCOPY EXAMINATION: CPT | Performed by: PHYSICIAN ASSISTANT

## 2023-10-03 PROCEDURE — 99213 OFFICE O/P EST LOW 20 MIN: CPT | Mod: 25 | Performed by: PHYSICIAN ASSISTANT

## 2023-10-03 PROCEDURE — G0463 HOSPITAL OUTPT CLINIC VISIT: HCPCS

## 2023-10-03 ASSESSMENT — PAIN SCALES - GENERAL: PAINLEVEL: NO PAIN (0)

## 2023-10-03 NOTE — LETTER
10/3/2023         RE: Zaheer Mcgee  [address]  [place] [address]        Dear Colleague,    Thank you for referring your patient, Zaheer Mcgee, to the [facility] [place]. Please see a copy of my visit note below.    Otolaryngology Consultation    Patient: Zaheer Mcgee  : 1970    Patient presents with:  Hearing Problem: ASHL/ medical clearance      HPI:  Zaheer Mcgee is a 53 year old male seen today for ASNHL, medical clearance.   He reports gradual hearing loss about 15+ years ago. He noted his left ear has been worse in the past. He has hearing aids for the last several years but does not use.   Intermittent tinnitus bilateral.     Denies COM   Hx of childhood BTT.   Denies otalgia, otorrhea  Denies worrisome tinnitus  Denies fluctuating hearing loss or tinnitus.   Denies vertigo or facial paraesthesia.   Last set of Hearing aids from 2019.     Audiogram 2023  Type A tympanograms  Thresholds are mild rising to normal sloping to severe asymmetrical sensorineural hearing loss.  SRT=PTA  WRS-  Right-100%@60 dB  Left-100% @70 dB    Current Outpatient Rx   Medication Sig Dispense Refill     Acetaminophen 325 MG CAPS Take 325-650 mg by mouth every 4 hours as needed       ammonium lactate (AMLACTIN) 12 % external cream Apply topically 2 times daily 385 g 3     busPIRone (BUSPAR) 5 MG tablet Take 5 mg by mouth 3 times daily       famotidine (PEPCID) 20 MG tablet Take 1 tablet (20 mg) by mouth 2 times daily 90 tablet 1     loratadine (CLARITIN) 10 MG tablet Take 10 mg by mouth daily       omeprazole (PRILOSEC OTC) 20 MG EC tablet Take 2 tablets (40 mg) by mouth daily 180 tablet 1     traMADol (ULTRAM) 50 MG tablet TAKE 1 TABLET BY MOUTH TWICE DAILY AS NEEDED FOR SEVERE PAIN(7-10) 60 tablet 0     lactase (LACTAID) 3000 UNIT tablet Take 1 tablet (3,000 Units) by mouth 3 times daily (with meals) (Patient not taking: Reported on 10/2/2023) 30 tablet 1       Allergies:  "Patient has no known allergies.     Past Medical History:   Diagnosis Date     Chiari I malformation (H)       (central serous retinopathy)     LE     Nonsenile cataract        Past Surgical History:   Procedure Laterality Date     COLONOSCOPY - HIM SCAN N/A 03/01/2008    Colonoscopy lesion remove  3/2008     COLONOSCOPY - HIM SCAN N/A 03/09/2017    Colonoscopy screening 5 year recall due 3/2022     groin surgery  01/01/1999     STRABISMUS SURGERY Right 01/01/1972       ENT family history reviewed    Social History     Tobacco Use     Smoking status: Former     Packs/day: 2.00     Types: Cigarettes     Smokeless tobacco: Never       Review of Systems  ROS: 10 point ROS neg other than the symptoms noted above in the HPI     Physical Exam  /68   Pulse 83   Temp 98.1  F (36.7  C)   Ht 1.702 m (5' 7\")   Wt 81.6 kg (180 lb)   SpO2 97%   BMI 28.19 kg/m    General - The patient is well nourished and well developed, and appears to have good nutritional status.  Alert and oriented to person and place, answers questions and cooperates with examination appropriately.   Head and Face - Normocephalic and atraumatic, with no gross asymmetry noted.  The facial nerve is intact, with strong symmetric movements.  Voice and Breathing - The patient was breathing comfortably without the use of accessory muscles. There was no wheezing, stridor, or stertor.  The patients voice was clear and strong, and had appropriate pitch and quality.  Ears -ears examined with otoscope and under otologic microscopy.  The external auditory canals are patent, the tympanic membranes are intact without effusion, retraction or mass.  Bony landmarks are intact.  Eyes - Extraocular movements intact, and the pupils were reactive to light.  Sclera were not icteric or injected, conjunctiva were pink and moist.  Mouth - Examination of the oral cavity showed pink, healthy oral mucosa. No lesions or ulcerations noted.  The tongue was mobile and " midline, and the dentition were in good condition.    Throat - The walls of the oropharynx were smooth, pink, moist, symmetric, and had no lesions or ulcerations.  The tonsillar pillars and soft palate were symmetric.  The uvula was midline on elevation.    Neck - Normal midline excursion of the laryngotracheal complex during swallowing.  Full range of motion on passive movement.  Palpation of the occipital, submental, submandibular, internal jugular chain, and supraclavicular nodes did not demonstrate any abnormal lymph nodes or masses.  Palpation of the thyroid was soft and smooth, with no nodules or goiter appreciated.  The trachea was mobile and midline.  Nose - External contour is symmetric, no gross deflection or scars.  Nasal mucosa is pink and moist with no abnormal mucus.      Impression and Plan- Zaheer Mcgee is a 53 year old male with:        ICD-10-CM    1. ASNHL (asymmetrical sensorineural hearing loss)  H90.3 MR Internal Auditory Canal wo&w Contrast     Adult Audiology  Referral      2. Tinnitus, bilateral  H93.13 MR Internal Auditory Canal wo&w Contrast     Adult Audiology  Referral        Complete MRI of IAC  Hearing protection  Annual audiogram  Medical clearance for HA.       The possible etiologies for this were discussed. They include medication, infection, trauma or neoplasm.  Infrequently a sensorineural hearing loss will occur during or soon after a viral upper respiratory infection. I  discussed the indication for MRI of the Brain and Internal Auditory Canals.  The possibility of acoustic neuroma causing asymmetrical nerve hearing loss was discussed.  The patient was told acoustic neuromas are very rare, benign, and generally treated by observation only.          Belén Arreola PA-C  ENT  [facility], [place]    Again, thank you for allowing me to participate in the care of your patient.        Sincerely,        Belén Arreola PA-C

## 2023-10-03 NOTE — PROGRESS NOTES
Otolaryngology Consultation    Patient: Zaheer Mcgee  : 1970    Patient presents with:  Hearing Problem: ASHL/ medical clearance      HPI:  Zaheer Mcgee is a 53 year old male seen today for ASNHL, medical clearance.   He reports gradual hearing loss about 15+ years ago. He noted his left ear has been worse in the past. He has hearing aids for the last several years but does not use.   Intermittent tinnitus bilateral.     Denies COM   Hx of childhood BTT.   Denies otalgia, otorrhea  Denies worrisome tinnitus  Denies fluctuating hearing loss or tinnitus.   Denies vertigo or facial paraesthesia.   Last set of Hearing aids from 2019.     Audiogram 2023  Type A tympanograms  Thresholds are mild rising to normal sloping to severe asymmetrical sensorineural hearing loss.  SRT=PTA  WRS-  Right-100%@60 dB  Left-100% @70 dB    Current Outpatient Rx   Medication Sig Dispense Refill    Acetaminophen 325 MG CAPS Take 325-650 mg by mouth every 4 hours as needed      ammonium lactate (AMLACTIN) 12 % external cream Apply topically 2 times daily 385 g 3    busPIRone (BUSPAR) 5 MG tablet Take 5 mg by mouth 3 times daily      famotidine (PEPCID) 20 MG tablet Take 1 tablet (20 mg) by mouth 2 times daily 90 tablet 1    loratadine (CLARITIN) 10 MG tablet Take 10 mg by mouth daily      omeprazole (PRILOSEC OTC) 20 MG EC tablet Take 2 tablets (40 mg) by mouth daily 180 tablet 1    traMADol (ULTRAM) 50 MG tablet TAKE 1 TABLET BY MOUTH TWICE DAILY AS NEEDED FOR SEVERE PAIN(7-10) 60 tablet 0    lactase (LACTAID) 3000 UNIT tablet Take 1 tablet (3,000 Units) by mouth 3 times daily (with meals) (Patient not taking: Reported on 10/2/2023) 30 tablet 1       Allergies: Patient has no known allergies.     Past Medical History:   Diagnosis Date    Chiari I malformation (H)      (central serous retinopathy)     LE    Nonsenile cataract        Past Surgical History:   Procedure Laterality Date    COLONOSCOPY - HIM SCAN N/A  "03/01/2008    Colonoscopy lesion remove  3/2008    COLONOSCOPY - HIM SCAN N/A 03/09/2017    Colonoscopy screening 5 year recall due 3/2022    groin surgery  01/01/1999    STRABISMUS SURGERY Right 01/01/1972       ENT family history reviewed    Social History     Tobacco Use    Smoking status: Former     Packs/day: 2.00     Types: Cigarettes    Smokeless tobacco: Never       Review of Systems  ROS: 10 point ROS neg other than the symptoms noted above in the HPI     Physical Exam  /68   Pulse 83   Temp 98.1  F (36.7  C)   Ht 1.702 m (5' 7\")   Wt 81.6 kg (180 lb)   SpO2 97%   BMI 28.19 kg/m    General - The patient is well nourished and well developed, and appears to have good nutritional status.  Alert and oriented to person and place, answers questions and cooperates with examination appropriately.   Head and Face - Normocephalic and atraumatic, with no gross asymmetry noted.  The facial nerve is intact, with strong symmetric movements.  Voice and Breathing - The patient was breathing comfortably without the use of accessory muscles. There was no wheezing, stridor, or stertor.  The patients voice was clear and strong, and had appropriate pitch and quality.  Ears -ears examined with otoscope and under otologic microscopy.  The external auditory canals are patent, the tympanic membranes are intact without effusion, retraction or mass.  Bony landmarks are intact.  Eyes - Extraocular movements intact, and the pupils were reactive to light.  Sclera were not icteric or injected, conjunctiva were pink and moist.  Mouth - Examination of the oral cavity showed pink, healthy oral mucosa. No lesions or ulcerations noted.  The tongue was mobile and midline, and the dentition were in good condition.    Throat - The walls of the oropharynx were smooth, pink, moist, symmetric, and had no lesions or ulcerations.  The tonsillar pillars and soft palate were symmetric.  The uvula was midline on elevation.    Neck - Normal " midline excursion of the laryngotracheal complex during swallowing.  Full range of motion on passive movement.  Palpation of the occipital, submental, submandibular, internal jugular chain, and supraclavicular nodes did not demonstrate any abnormal lymph nodes or masses.  Palpation of the thyroid was soft and smooth, with no nodules or goiter appreciated.  The trachea was mobile and midline.  Nose - External contour is symmetric, no gross deflection or scars.  Nasal mucosa is pink and moist with no abnormal mucus.      Impression and Plan- Zaheer Mcgee is a 53 year old male with:        ICD-10-CM    1. ASNHL (asymmetrical sensorineural hearing loss)  H90.3 MR Internal Auditory Canal wo&w Contrast     Adult Audiology  Referral      2. Tinnitus, bilateral  H93.13 MR Internal Auditory Canal wo&w Contrast     Adult Audiology  Referral        Complete MRI of IAC  Hearing protection  Annual audiogram  Medical clearance for HA.       The possible etiologies for this were discussed. They include medication, infection, trauma or neoplasm.  Infrequently a sensorineural hearing loss will occur during or soon after a viral upper respiratory infection. I  discussed the indication for MRI of the Brain and Internal Auditory Canals.  The possibility of acoustic neuroma causing asymmetrical nerve hearing loss was discussed.  The patient was told acoustic neuromas are very rare, benign, and generally treated by observation only.          Belén Arreola PA-C  ENT  [facility], [place]

## 2023-10-03 NOTE — TELEPHONE ENCOUNTER
Call returned to patient, notified Order for CPAP has been faxed to [facility].

## 2023-10-06 ENCOUNTER — OFFICE VISIT (OUTPATIENT)
Dept: AUDIOLOGY | Facility: OTHER | Age: 53
End: 2023-10-06
Attending: AUDIOLOGIST
Payer: COMMERCIAL

## 2023-10-06 ENCOUNTER — LAB (OUTPATIENT)
Dept: LAB | Facility: OTHER | Age: 53
End: 2023-10-06
Attending: AUDIOLOGIST
Payer: COMMERCIAL

## 2023-10-06 DIAGNOSIS — J30.2 SEASONAL ALLERGIC RHINITIS, UNSPECIFIED TRIGGER: ICD-10-CM

## 2023-10-06 DIAGNOSIS — H90.3 SENSORINEURAL HEARING LOSS, ASYMMETRICAL: Primary | ICD-10-CM

## 2023-10-06 PROCEDURE — 86003 ALLG SPEC IGE CRUDE XTRC EA: CPT | Mod: ZL

## 2023-10-06 PROCEDURE — 86003 ALLG SPEC IGE CRUDE XTRC EA: CPT | Performed by: PHYSICIAN ASSISTANT

## 2023-10-06 PROCEDURE — 36415 COLL VENOUS BLD VENIPUNCTURE: CPT | Mod: ZL

## 2023-10-06 PROCEDURE — 36415 COLL VENOUS BLD VENIPUNCTURE: CPT | Performed by: PHYSICIAN ASSISTANT

## 2023-10-06 PROCEDURE — 92591 HC HEARING AID EXAM BINAURAL: CPT | Performed by: AUDIOLOGIST

## 2023-10-06 PROCEDURE — 99207 PR NO CHARGE LOS: CPT | Performed by: AUDIOLOGIST

## 2023-10-06 PROCEDURE — 92591 PR HEARING AID EXAM BINAURAL: CPT | Performed by: AUDIOLOGIST

## 2023-10-06 NOTE — PROGRESS NOTES
BACKGROUND:  Zaheer was seen today for consult regarding hearing aids. The patient notes difficulty with communication in a variety of listening situations and would like to explore possible benefit obtained via use of amplification.      Patient has received medical clearance for hearing aid use from Belén Arreola PA-C.  Zaheer is a binaural hearing aid candidate and will receive a Hearing Aid Recommendation today.    RESULTS:  Audiology Assistant reviewed below information:    Estimated insurance coverage for hearing aids reviewed.  [place] Department of Health form titled 'Legal rights and consumer information about purchasing a hearing instrument verbally reviewed and given to patient. Trial Period, cancellation fee, warranties highlighted. Patient risk factors have been provided to the patient in writing prior to the sale of the hearing aid per FDA regulation. The risk factors are also available in the User Instructional Booklet to be presented on the day of the hearing aid fitting.       Hearing aid recommendation provided by Audiologist.    Thru use of hearing aids patient would like to improve ability to hear:  where there are groups and noise.   to hear the television at a lower volume to enjoy this activity with other people.   Zaheer also reports trouble hearing in the car, at home, and on the telephone if there is not a volume control.      Discussed hearing aid styles, technology, features, and options at length with Zaheer.  Sample devices were shown. Pros/Cons of options reviewed.  Expectations for amplification discussed. .Also discussed the importance of binaural amplification for hearing speech in the presence of background noise and localizing the directions of sounds.  Wireless options were also discussed at length and sample devices were shown.       Hearing Aid Recommendations: Hearing Aid Recommendation reviewed with patient. Pt chose to proceed with order and Purchase Agreement completed.  Copies provided to patient.      Hearing Aids:  Binaural Oticon Real 2 miniRITE R  Color: 90  Battery Size: rechargeable  Earmold/Tips: 2-85 8mm closed      RECOMMENDATIONS:  The 45 day trial period was explained to patient, and they expressed understanding. Mr. Mcgee signed the Hearing Aid Purchase Agreement and was given a copy, as well as details on his hearing aids. Patient was counseled that exact out of pocket amounts cannot be determined for hearing aid claims being sent to insurance. Any insurance coverage information presented to the patient is an estimate only, and is not a guarantee of payment. Patient has been advised to check with their own insurance.      Patient scheduled to return to clinic in 2 weeks for hearing aid fitting, programming and orientation.    He has a screaming mini alarm clock from [employer] that he reports he can hear intermittently. Discussed troubleshooting the alarm clock for functional issues or looking for an alarm with low frequencies where hearing loss is more normal.    Loretta Vigil M.S.,[facility]-A  Audiologist #[id number]

## 2023-10-08 LAB — EAST WHITE PINE IGE QN: <0.1 KU(A)/L

## 2023-10-09 ENCOUNTER — APPOINTMENT (OUTPATIENT)
Dept: CHIROPRACTIC MEDICINE | Facility: OTHER | Age: 53
End: 2023-10-09

## 2023-10-09 ENCOUNTER — APPOINTMENT (OUTPATIENT)
Dept: OCCUPATIONAL MEDICINE | Facility: OTHER | Age: 53
End: 2023-10-09

## 2023-10-09 LAB
A ALTERNATA IGE QN: <0.1 KU(A)/L
A FUMIGATUS IGE QN: <0.1 KU(A)/L
C HERBARUM IGE QN: <0.1 KU(A)/L
CALIF WALNUT POLN IGE QN: <0.1 KU(A)/L
CAT DANDER IGG QN: <0.1 KU(A)/L
CEDAR IGE QN: <0.1 KU(A)/L
COCKSFOOT IGE QN: <0.1 KU(A)/L
COMMON RAGWEED IGE QN: <0.1 KU(A)/L
COTTONWOOD IGE QN: <0.1 KU(A)/L
D FARINAE IGE QN: <0.1 KU(A)/L
D PTERONYSS IGE QN: <0.1 KU(A)/L
DOG DANDER+EPITH IGE QN: <0.1 KU(A)/L
E PURPURASCENS IGE QN: <0.1 KU(A)/L
ENGL PLANTAIN IGE QN: <0.1 KU(A)/L
FIREBUSH IGE QN: <0.1 KU(A)/L
GIANT RAGWEED IGE QN: <0.1 KU(A)/L
GOOSEFOOT IGE QN: <0.1 KU(A)/L
JOHNSON GRASS IGE QN: <0.1 KU(A)/L
MAPLE IGE QN: <0.1 KU(A)/L
MUGWORT IGE QN: <0.1 KU(A)/L
NETTLE IGE QN: <0.1 KU(A)/L
P NOTATUM IGE QN: <0.1 KU(A)/L
RED MULBERRY IGE QN: <0.1 KU(A)/L
SALTWORT IGE QN: <0.1 KU(A)/L
SHEEP SORREL IGE QN: <0.1 KU(A)/L
SILVER BIRCH IGE QN: <0.1 KU(A)/L
TIMOTHY IGE QN: <0.1 KU(A)/L
WHITE ASH IGE QN: <0.1 KU(A)/L
WHITE ELM IGE QN: <0.1 KU(A)/L
WHITE MULBERRY IGE QN: <0.1 KU(A)/L
WHITE OAK IGE QN: <0.1 KU(A)/L
WORMWOOD IGE QN: <0.1 KU(A)/L

## 2023-10-09 PROCEDURE — 99499 UNLISTED E&M SERVICE: CPT | Performed by: CHIROPRACTOR

## 2023-10-12 DIAGNOSIS — H35.713 CENTRAL SEROUS CHORIORETINOPATHY OF BOTH EYES: Primary | ICD-10-CM

## 2023-10-16 ENCOUNTER — OFFICE VISIT (OUTPATIENT)
Dept: OPHTHALMOLOGY | Facility: CLINIC | Age: 53
End: 2023-10-16
Attending: OPHTHALMOLOGY
Payer: COMMERCIAL

## 2023-10-16 DIAGNOSIS — H35.713 CENTRAL SEROUS CHORIORETINOPATHY OF BOTH EYES: ICD-10-CM

## 2023-10-16 PROCEDURE — 92134 CPTRZ OPH DX IMG PST SGM RTA: CPT | Performed by: OPHTHALMOLOGY

## 2023-10-16 PROCEDURE — 99214 OFFICE O/P EST MOD 30 MIN: CPT | Performed by: OPHTHALMOLOGY

## 2023-10-16 PROCEDURE — 76512 OPH US DX B-SCAN: CPT | Performed by: OPHTHALMOLOGY

## 2023-10-16 PROCEDURE — 99213 OFFICE O/P EST LOW 20 MIN: CPT | Performed by: OPHTHALMOLOGY

## 2023-10-16 ASSESSMENT — REFRACTION_WEARINGRX
OD_SPHERE: -0.25
OS_AXIS: 007
OD_AXIS: 112
OS_CYLINDER: +1.50
OD_ADD: +2.25
OD_CYLINDER: +4.25
OS_ADD: +2.25
OS_SPHERE: -0.75

## 2023-10-16 ASSESSMENT — VISUAL ACUITY
OS_BAT_MED: 20/30
OD_BAT_MED: 20/20
OD_BAT_HIGH: 20/25
METHOD: SNELLEN - LINEAR
CORRECTION_TYPE: GLASSES
OS_BAT_HIGH: 20/40-2
OD_CC: 20/20
OS_CC+: -2
OS_CC: 20/25
OD_CC+: -1

## 2023-10-16 ASSESSMENT — PACHYMETRY
EXAM_DATE: 3/9/2023
OS_CT(UM): 544
OD_CT(UM): 513

## 2023-10-16 ASSESSMENT — TONOMETRY
IOP_METHOD: TONOPEN
OD_IOP_MMHG: 16
OS_IOP_MMHG: 14

## 2023-10-16 ASSESSMENT — EXTERNAL EXAM - LEFT EYE: OS_EXAM: NORMAL

## 2023-10-16 ASSESSMENT — CONF VISUAL FIELD
OS_INFERIOR_TEMPORAL_RESTRICTION: 0
OD_INFERIOR_NASAL_RESTRICTION: 0
OS_NORMAL: 1
OD_NORMAL: 1
OS_INFERIOR_NASAL_RESTRICTION: 0
OS_SUPERIOR_NASAL_RESTRICTION: 0
OD_SUPERIOR_NASAL_RESTRICTION: 0
OD_INFERIOR_TEMPORAL_RESTRICTION: 0
OD_SUPERIOR_TEMPORAL_RESTRICTION: 0
OS_SUPERIOR_TEMPORAL_RESTRICTION: 0

## 2023-10-16 ASSESSMENT — CUP TO DISC RATIO
OS_RATIO: 0.65
OD_RATIO: 0.65

## 2023-10-16 ASSESSMENT — EXTERNAL EXAM - RIGHT EYE: OD_EXAM: NORMAL

## 2023-10-16 NOTE — NURSING NOTE
Chief Complaints and History of Present Illnesses   Patient presents with    Glaucoma Suspect Follow Up     Chief Complaint(s) and History of Present Illness(es)       Glaucoma Suspect Follow Up              Laterality: both eyes              Comments    Pt. States that VA has been fluctuating BE. Has had some irritation especially in the morning. No pain BE. No flashes or floaters BE.   Maureen Ricks COT 9:23 AM October 16, 2023

## 2023-10-16 NOTE — PROGRESS NOTES
CC: follow up of  and glaucoma evaluation    Interval History Oct 16, 2023: No acute changes in vision, no flashes. New floater in the left eye for the past month  HPI: Zaheer Mcgee is a 53 year old gentleman who has a history of vision loss in the left eye since 2006.  Thought to be Central serous retinopathy     Imaging:   OCT macula 10/16/23    RIGHT EYE: good foveal contour.  There were no cystic changes.    LEFT EYE:  good foveal contour. Stable appearance from last OCT--disruption of the IS/OS junction and retinal pigment epithelium. Thick choroid. Small shallow pigment epithelial detachment - stable    OVF 24-2 06/09/23   OD: MD 0.8 reliable, normal    OS: MD 0.5 reliable; paracentral spot of decreased sensitivity    OCT RNFL 03/09/23   OD: normal thickness throughout  OS: temporal borderline thinning  Could be secondary to history of Central serous retinopathy?    AF 02/04/22 :   Right eye: no abnormalities  Left eye: Retinal pigment epithelium atrophy with surrounding hyperfluorescence.     Ultrasound 10/16/23  Hyperechoic areas (calcification) in posterior pole appear grossly stable from prior U/S (09/18). Vitreous debris c/w PVD both eyes.  Left eye there is an area of focal chorioretinal elevation noted with marker that is approximately mid-periphery superiorly (nystagmus makes marking precise location difficult).   No patent tears, holes or breaks in area noted.   Retina otherwise appears attached, negative for other CR elevations.     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  Differential diagnosis: other causes of maculopathy. No progression  No obvious choroidal lesion, diffuse Retinal pigment epithelium changes  No history of plaquenil intake  Used Flonase nasal spray rarely - hasn't used in years  Patient with history of sleep apnea- diagnosed 2 ys ago. Uses CPAP  Thick choroid left eye > right eye    Stable distortion   Stable exam and Optical  Coherence Tomography. Pigment epithelial detachment without subretinal fluid.   observe   Continue using Amsler grid    Stop caffeine intake - drinks coffee daily 1 travel mug (24oz)  Stable     # Cataract, both eyes  Glare Testing     Medium High   Right 20/20 20/25   Left 20/30 20/40-2      becoming visually significant    monitor       # glaucoma suspect OU  - Based in increased c/d ratio   - Tmax 22/21  - FH: Mother with glaucoma (possibly)  - History of taking steroids   - Pachy: 513/544  - Last HVF 03/09/23: new superior arcuate right eye- improved after removing mask and repeated testing; OS normal (first time visual field - reliable OU)  - Last OCT RNFL 03/09/23: OD normal, OS temporal borderline thinning  - Hx of medication intolerance: none  - Hx of kidney stones or asthma: no  - Personal/family hx sickle cell: no  - Hx eye trauma: none  - Gonio: Open to  OU  -RNFL without thinning right eye; repeated visual field right eye showed improvement.  OS VF normal and RNFL thinning likely related to  related atrophy rather than glaucomatous change so will not start drops.    Recommend alternating glaucoma testing  None done 10/16/23     # History of Chiari I malformation based on MRI findings  # choroidal calcifications: detected on ultrasound   Consider repeating ultrasound in 6 months      Follow up with retina 6 months with  Optical Coherence Tomography/ retinal nerve fiber layer/ optos autofluorescence     ~~~~~~~~~~~~~~~~~~~~~~~~~~~~~~~~~~   Complete documentation of historical and exam elements from today's encounter can be found in the full encounter summary report (not reduplicated in this progress note).  I personally obtained the chief complaint(s) and history of present illness.  I confirmed and edited as necessary the review of systems, past medical/surgical history, family history, social history, and examination findings as documented by others; and I examined the patient myself.  I  personally reviewed the relevant tests, images, and reports as documented above.  I formulated and edited as necessary the assessment and plan and discussed the findings and management plan with the patient and family    Diana Patrick MD   of Ophthalmology.  Retina Service   Department of Ophthalmology and Visual Neurosciences   [facility]  Phone: [phone]   Fax: [phone]

## 2023-10-17 ENCOUNTER — HOSPITAL ENCOUNTER (OUTPATIENT)
Dept: MRI IMAGING | Facility: HOSPITAL | Age: 53
Discharge: HOME | End: 2023-10-17
Attending: PHYSICIAN ASSISTANT | Admitting: PHYSICIAN ASSISTANT
Payer: COMMERCIAL

## 2023-10-17 DIAGNOSIS — H90.3 ASNHL (ASYMMETRICAL SENSORINEURAL HEARING LOSS): ICD-10-CM

## 2023-10-17 DIAGNOSIS — R93.89 IMAGING ABNORMALITIES: Primary | ICD-10-CM

## 2023-10-17 DIAGNOSIS — H93.13 TINNITUS, BILATERAL: ICD-10-CM

## 2023-10-17 PROCEDURE — A9585 GADOBUTROL INJECTION: HCPCS | Mod: JZ | Performed by: RADIOLOGY

## 2023-10-17 PROCEDURE — 255N000002 HC RX 255 OP 636: Mod: JZ | Performed by: RADIOLOGY

## 2023-10-17 PROCEDURE — 70543 MRI ORBT/FAC/NCK W/O &W/DYE: CPT

## 2023-10-17 RX ORDER — GADOBUTROL 604.72 MG/ML
7.5 INJECTION INTRAVENOUS ONCE
Status: COMPLETED | OUTPATIENT
Start: 2023-10-17 | End: 2023-10-17

## 2023-10-17 RX ADMIN — GADOBUTROL 7.5 ML: 604.72 INJECTION INTRAVENOUS at 09:48

## 2023-10-18 ENCOUNTER — TELEPHONE (OUTPATIENT)
Dept: OTOLARYNGOLOGY | Facility: OTHER | Age: 53
End: 2023-10-18

## 2023-10-20 ENCOUNTER — OFFICE VISIT (OUTPATIENT)
Dept: AUDIOLOGY | Facility: OTHER | Age: 53
End: 2023-10-20
Attending: AUDIOLOGIST
Payer: COMMERCIAL

## 2023-10-20 DIAGNOSIS — H90.3 SENSORINEURAL HEARING LOSS (SNHL) OF BOTH EARS: Primary | ICD-10-CM

## 2023-10-20 PROCEDURE — 92593 PR HEARING AID CHECK, BINAURAL: CPT | Performed by: AUDIOLOGIST

## 2023-10-20 PROCEDURE — V5011 HEARING AID FITTING/CHECKING: HCPCS | Mod: RT | Performed by: AUDIOLOGIST

## 2023-10-20 PROCEDURE — V5261 HEARING AID, DIGIT, BIN, BTE: HCPCS | Mod: NU | Performed by: AUDIOLOGIST

## 2023-10-20 PROCEDURE — V5020 CONFORMITY EVALUATION: HCPCS | Mod: RT | Performed by: AUDIOLOGIST

## 2023-10-20 PROCEDURE — V5160 DISPENSING FEE BINAURAL: HCPCS | Performed by: AUDIOLOGIST

## 2023-10-20 NOTE — PROGRESS NOTES
AUDIOLOGY REPORT    SUBJECTIVE: Zaheer Mcgee, a 53 year old male, was seen in the Audiology Clinic at [facility] today for a Binaural hearing aid fitting. Previous results have revealed a bilateral  sensorineural hearing loss.     OBJECTIVE:  Prior to fitting, a hearing aid check was performed to ensure device functionality. The hearing aid conformity evaluation was completed.The hearing aids were placed and they provided a good fit. Real-ear-probe-microphone measurements were completed on the [email or web address] system and were a good match to NAL-NL2 target with soft sounds audible, moderate sounds comfortable, and loud sounds below discomfort. UCLs are verified through maximum power output measures and demonstrate appropriate limiting of loud inputs.     Hearing Device Info   Left Ear Make: Oticon   Left Ear Model #: Real 2 miniRITE R   Left Ear Style: BTE   Left HA Warranty End Date: 11/5/2026   Left HA Serial #: [id number]   Right Ear Make: Oticon   Right Ear Model #: Real 2 miniRITE R   Right Ear Style: BTE   Right HA Warranty End Date: 11/5/2026   Right HA Serial #: [id number]   Fitting Plan: Medical Assistance       ASSESSMENT: Hearing aid fitting completed today. Verification measures were performed. Patient and/or caregiver demonstrated ability to insert and remove hearing aids and adjust volume toggle.    Loretta Vigil M.S., [facility]A  Audiologist #[id number]      HEARING AID ORIENTATION/AUDIOLOGY ASSISTANT      Mr. Mcgee was oriented to proper hearing aid use, care, cleaning (no water, dry brush), batteries (size Rechargeable, low-battery signal), aid insertion/removal, user booklet, warranty information, storage cases, and other hearing aid details. The patient confirmed understanding of hearing aid use and care, and showed proper insertion of hearing aid while in the office today. Mr. Mcgee reported good volume and sound quality today.    Maida Golden  Audiology Assistant  [facility]  [place]  [phone]        PLAN: Mr. Mcgee will return for follow-up in 2-3 weeks for a hearing aid review appointment. Please call this clinic with questions regarding today s appointment.

## 2023-10-23 ENCOUNTER — TELEPHONE (OUTPATIENT)
Dept: OTOLARYNGOLOGY | Facility: OTHER | Age: 53
End: 2023-10-23

## 2023-10-23 ENCOUNTER — HOSPITAL ENCOUNTER (OUTPATIENT)
Dept: CT IMAGING | Facility: HOSPITAL | Age: 53
Discharge: HOME | End: 2023-10-23
Attending: PHYSICIAN ASSISTANT
Payer: COMMERCIAL

## 2023-10-23 ENCOUNTER — MEDICAL CORRESPONDENCE (OUTPATIENT)
Dept: HEALTH INFORMATION MANAGEMENT | Facility: CLINIC | Age: 53
End: 2023-10-23
Payer: COMMERCIAL

## 2023-10-23 DIAGNOSIS — R93.89 IMAGING ABNORMALITIES: ICD-10-CM

## 2023-10-23 DIAGNOSIS — R93.89 ABNORMAL COMPUTED TOMOGRAPHY ANGIOGRAPHY (CTA): Primary | ICD-10-CM

## 2023-10-23 PROCEDURE — 70498 CT ANGIOGRAPHY NECK: CPT

## 2023-10-23 PROCEDURE — 250N000011 HC RX IP 250 OP 636: Performed by: RADIOLOGY

## 2023-10-23 PROCEDURE — 70450 CT HEAD/BRAIN W/O DYE: CPT | Mod: XU

## 2023-10-23 RX ORDER — IOPAMIDOL 755 MG/ML
45 INJECTION, SOLUTION INTRAVASCULAR ONCE
Status: COMPLETED | OUTPATIENT
Start: 2023-10-23 | End: 2023-10-23

## 2023-10-23 RX ADMIN — IOPAMIDOL 45 ML: 755 INJECTION, SOLUTION INTRAVENOUS at 09:21

## 2023-10-23 NOTE — TELEPHONE ENCOUNTER
Spoke to Oksana at the [facility]. Expedited referral placed and appointment made for patient due to an, Abnormal CTA head & neck.  Appointment made with, Dr. Ramirez at [facility], in [place], 10- at 1:30pm, check in at 1:15pm. Will call and notify patient of appointment.

## 2023-10-23 NOTE — TELEPHONE ENCOUNTER
Spoke with patient to let him know of his appointment at the [facility], with Dr. Ramirez /Neurology; check in 1:15 p.m, appointment 1:30 p.m. address & phone number given for facility. Patient expressed verbal understanding.

## 2023-10-30 PROBLEM — K21.9 GERD (GASTROESOPHAGEAL REFLUX DISEASE): Status: ACTIVE | Noted: 2023-10-30

## 2023-10-30 PROBLEM — H91.93 BILATERAL HEARING LOSS: Status: ACTIVE | Noted: 2019-12-20

## 2023-10-30 PROBLEM — I10 HTN (HYPERTENSION): Status: ACTIVE | Noted: 2017-12-04

## 2023-10-30 PROBLEM — F90.9 ADHD: Status: ACTIVE | Noted: 2019-12-20

## 2023-10-30 PROBLEM — F41.9 ANXIETY: Status: ACTIVE | Noted: 2021-03-12

## 2023-10-30 NOTE — PROGRESS NOTES
NEUROLOGY CONSULTATION NOTE       [facility] NEUROLOGY [place]  [address] [place] [address]  Tel: [phone]  Fax: [phone]  [email or web address]     Zahere Mcgee,  1970, MRN [id number]  PCP: Avila Rivera  Date: 10/31/2023     ASSESSMENT & PLAN     Visit Diagnosis  Cerebral vasculitis     R/O CNS vasculitis  53-year-old male with history of ADHD, anxiety, HTN, ZOFIA who had a MRI of the head for asymmetric hearing loss and was noted to have asymmetric flow related signal within the internal carotid arteries.  This was confirmed with CTA that showed narrowing of the right ICA with occlusion of the right terminal ICA and also markedly diminutive left MCA with a short segment occlusion of the left M2.  Possibility of vascular was raised.  However, patient has no symptoms to suggest CNS vasculitis.  I have recommended:    1.  Lab work to include ANCA, antinuclear antibody, beta-2 glycoprotein, cardiolipin antibody, C3, C4, CCP, DNA double-stranded antibodies, lipid panel, lupus anticoagulant, SSA and SSB antibody  2.  Lumbar puncture under fluoroscopy send CSF for routine studies and bacteriology  3.  Referred to IR for cerebral angiogram to rule out CNS vasculitis  4.  Follow-up after above tests    Thank you again for this referral, please feel free to contact me if you have any questions.    Buddy Ramirez MD  [facility]  ([facility], [facility]     REASON FOR CONSULTATION Cerebral vasculitis        HISTORY OF PRESENT ILLNESS     We have been requested by Belén Souza to evaluate Zaheer Mcgee who is a 53 year old  male for possible vasculitis    Patient is a 53-year-old male with history of ADHD, anxiety, HTN, ZOFIA who was referred for evaluation of abnormality noted on his CT angiogram.  According to patient he developed hearing loss more than 10 years ago.  He had hearing aids that stopped working as  he did not have any insurance he stopped using it.  He subsequently got new hearing aids and at that time was noticed to have asymmetric hearing loss more so on the left side.  MRI of the internal auditory canal was done that showed no internal auditory canal or CP angle mass but there was asymmetric flow related signal within the intracranial internal carotid arteries and a CTA was recommended.  It showed narrowing of the right ICA with occlusion of the right terminal ICA.  He also had markedly diminutive left MCA with possible short segment occlusion of the left M2 and possible infectious or inflammatory vasculitis was raised.  Patient denies any past history of headache, focal weakness, numbness, confusion or any seizures.     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    Surgical History:  He  has a past surgical history that includes strabismus surgery (Right, 01/01/1972); groin surgery (01/01/1999); Colonoscopy - HIM Scan (N/A, 03/01/2008); and Colonoscopy - HIM Scan (N/A, 03/09/2017).     SOCIAL HISTORY     Reviewed, and he  reports that he has quit smoking. His smoking use included cigarettes. He smoked an average of 2 packs per day. He has never used smokeless tobacco. He reports that he does not currently use alcohol.     FAMILY HISTORY     Reviewed, and family history includes Arthritis in his father, mother, and sister; Cerebrovascular Disease in his mother; Diabetes in his maternal grandmother and paternal grandmother; Glaucoma in his mother; Hypertension in his father  "and mother; Mental Illness in his father, mother, and sister; Osteoporosis in his mother.     ALLERGIES     No Known Allergies      REVIEW OF SYSTEMS     A 12 point review of system was performed and was negative except as outlined in the history of present illness.     HOME MEDICATIONS     Current Outpatient Rx   Medication Sig Dispense Refill    Acetaminophen 325 MG CAPS Take 325-650 mg by mouth every 4 hours as needed      busPIRone (BUSPAR) 5 MG tablet Take 5 mg by mouth 3 times daily      famotidine (PEPCID) 20 MG tablet Take 1 tablet (20 mg) by mouth 2 times daily 90 tablet 1    loratadine (CLARITIN) 10 MG tablet Take 10 mg by mouth daily      omeprazole (PRILOSEC OTC) 20 MG EC tablet Take 2 tablets (40 mg) by mouth daily 180 tablet 1    traMADol (ULTRAM) 50 MG tablet TAKE 1 TABLET BY MOUTH TWICE DAILY AS NEEDED FOR SEVERE PAIN(7-10) 60 tablet 0    ammonium lactate (AMLACTIN) 12 % external cream Apply topically 2 times daily 385 g 3    lactase (LACTAID) 3000 UNIT tablet Take 1 tablet (3,000 Units) by mouth 3 times daily (with meals) 30 tablet 1         PHYSICAL EXAM     Vital signs  /74 (BP Location: Left arm, Patient Position: Sitting)   Pulse 61   Ht 1.702 m (5' 7\")   Wt 84.4 kg (186 lb)   BMI 29.13 kg/m      Weight:   186 lbs 0 oz    Middle-age gentleman who is alert and oriented vital signs are reviewed and documented in electronic medical record.  Neck supple.  Neurologically speech was normal.  Cranial nerves II through XII were intact except he is hard of hearing and uses hearing aid motor strength 5/5 reflexes symmetrical toes downgoing.  No dysmetria noted on finger-nose testing gait normal Romberg negative.     PERTINENT DIAGNOSTIC STUDIES     Following studies were reviewed:     CTA HEAD AND NECK 10/23/2023  1.  CTA head: Circumferential narrowing of the right intracranial  internal carotid artery with occlusion of the right terminal internal  carotid artery. Markedly diminutive left MCA " with possible short  segment occlusion of left M2 segment. Findings are suspicious for  infectious or inflammatory vasculitis. Recommend expedited neurology  consult.  2.  CTA neck: Circumferential narrowing of the right internal carotid  artery, suspicious for infectious or inflammatory vasculitis.  Recommend clinical correlation, neurology consult.    MRI INTERNAL AUDITORY CANAL 10/17/2023  No IAC or CP angle mass.     Asymmetric flow-related signal within the intracranial internal  carotid arteries. Recommend CTA head and neck for further assessment.     Probable mild microvascular ischemic change.     PERTINENT LABS  Following labs were reviewed:  Lab on 10/06/2023   Component Date Value Ref Range Status    Kochia/Firebush IgE 10/06/2023 <0.10  <0.10 KU(A)/L Final    Weed Nettle IgE 10/06/2023 <0.10  <0.10 KU(A)/L Final    Russian Thistle IgE 10/06/2023 <0.10  <0.10 KU(A)/L Final    Sheep [place] IgE 10/06/2023 <0.10  <0.10 KU(A)/L Final    Sagebrush [place] IgE 10/06/2023 <0.10  <0.10 KU(A)/L Final    Ragweed Short IgE 10/06/2023 <0.10  <0.10 KU(A)/L Final    Mugwort IgE 10/06/2023 <0.10  <0.10 KU(A)/L Final    Lamb's Quarter's IgE 10/06/2023 <0.10  <0.10 KU(A)/L Final    Giant Ragweed IgE 10/06/2023 <0.10  <0.10 KU(A)/L Final    English Plantain IgE 10/06/2023 <0.10  <0.10 KU(A)/L Final    [place] IgE 10/06/2023 <0.10  <0.10 KU(A)/L Final    [place] Tree IgE 10/06/2023 <0.10  <0.10 KU(A)/L Final    White [place] IgE 10/06/2023 <0.10  <0.10 KU(A)/L Final    Silver Birch IgE 10/06/2023 <0.10  <0.10 KU(A)/L Final    Red [place] IgE 10/06/2023 <0.10  <0.10 KU(A)/L Final    [place] (White) IgE 10/06/2023 <0.10  <0.10 KU(A)/L Final    Maple Tree IgE 10/06/2023 <0.10  <0.10 KU(A)/L Final    Elm Tree IgE 10/06/2023 <0.10  <0.10 KU(A)/L Final    [place] IgE 10/06/2023 <0.10  <0.10 KU(A)/L Final    [place], Tree IgE 10/06/2023 <0.10  <0.10 KU(A)/L Final    [place], Tree IgE 10/06/2023 <0.10  <0.10 KU(A)/L Final     Penicillium notatum IgE 10/06/2023 <0.10  <0.10 KU(A)/L Final    Epicoccum purpurascens IgE 10/06/2023 <0.10  <0.10 KU(A)/L Final    Cladosporium herbarum IgE 10/06/2023 <0.10  <0.10 KU(A)/L Final    Aspergillis fumigatus IgE 10/06/2023 <0.10  <0.10 KU(A)/L Final    Alternaria alternata, Mold IgE 10/06/2023 <0.10  <0.10 KU(A)/L Final    Dermatophagoide pteronyssinus IgE 10/06/2023 <0.10  <0.10 KU(A)/L Final    Dermatophagoides farinae IgE 10/06/2023 <0.10  <0.10 KU(A)/L Final    Zaheer Grass IgE 10/06/2023 <0.10  <0.10 KU(A)/L Final    Cocksfoot (Orchard Grass) IgE 10/06/2023 <0.10  <0.10 KU(A)/L Final    Gilbert Grass IgE 10/06/2023 <0.10  <0.10 KU(A)/L Final    Dog Dander IgE 10/06/2023 <0.10  <0.10 KU(A)/L Final    Cat Dander IgE 10/06/2023 <0.10  <0.10 KU(A)/L Final        Total time spent for face to face visit, reviewing labs/imaging studies, counseling and coordination of care was: 1 Hour spent on the date of the encounter doing chart review, review of outside records, review of test results, interpretation of tests, patient visit, and documentation     This note was dictated using voice recognition software.  Any grammatical or context distortions are unintentional and inherent to the software.    No orders of the defined types were placed in this encounter.     New Prescriptions    No medications on file      Modified Medications    No medications on file

## 2023-10-31 ENCOUNTER — OFFICE VISIT (OUTPATIENT)
Dept: NEUROLOGY | Facility: CLINIC | Age: 53
End: 2023-10-31
Attending: PHYSICIAN ASSISTANT
Payer: COMMERCIAL

## 2023-10-31 VITALS
WEIGHT: 186 LBS | DIASTOLIC BLOOD PRESSURE: 74 MMHG | SYSTOLIC BLOOD PRESSURE: 126 MMHG | HEIGHT: 67 IN | BODY MASS INDEX: 29.19 KG/M2 | HEART RATE: 61 BPM

## 2023-10-31 DIAGNOSIS — I67.7 CEREBRAL VASCULITIS: Primary | ICD-10-CM

## 2023-10-31 PROCEDURE — 99205 OFFICE O/P NEW HI 60 MIN: CPT | Performed by: PSYCHIATRY & NEUROLOGY

## 2023-10-31 NOTE — LETTER
10/31/2023         RE: Zaheer Mcgee  [address]  [place] [address]        Dear Colleague,    Thank you for referring your patient, Zaheer Mcgee, to the [facility] NEUROLOGY CLINIC [place]. Please see a copy of my visit note below.    NEUROLOGY CONSULTATION NOTE       [facility] NEUROLOGY [place]  [address] [place] [address]  Tel: [phone]  Fax: [phone]  [email or web address]     Zaheer Mcgee,  1970, MRN [id number]  PCP: Avila Rivera  Date: 10/31/2023     ASSESSMENT & PLAN     Visit Diagnosis  Cerebral vasculitis     R/O CNS vasculitis  53-year-old male with history of ADHD, anxiety, HTN, ZOFIA who had a MRI of the head for asymmetric hearing loss and was noted to have asymmetric flow related signal within the internal carotid arteries.  This was confirmed with CTA that showed narrowing of the right ICA with occlusion of the right terminal ICA and also markedly diminutive left MCA with a short segment occlusion of the left M2.  Possibility of vascular was raised.  However, patient has no symptoms to suggest CNS vasculitis.  I have recommended:    1.  Lab work to include ANCA, antinuclear antibody, beta-2 glycoprotein, cardiolipin antibody, C3, C4, CCP, DNA double-stranded antibodies, lipid panel, lupus anticoagulant, SSA and SSB antibody  2.  Lumbar puncture under fluoroscopy send CSF for routine studies and bacteriology  3.  Referred to IR for cerebral angiogram to rule out CNS vasculitis  4.  Follow-up after above tests    Thank you again for this referral, please feel free to contact me if you have any questions.    Buddy Ramirez MD  [facility]  ([facility], [facility]     REASON FOR CONSULTATION Cerebral vasculitis        HISTORY OF PRESENT ILLNESS     We have been requested by Belén Souza to evaluate Zaheer Mcgee who is a 53 year old  male for possible  vasculitis    Patient is a 53-year-old male with history of ADHD, anxiety, HTN, ZOFIA who was referred for evaluation of abnormality noted on his CT angiogram.  According to patient he developed hearing loss more than 10 years ago.  He had hearing aids that stopped working as he did not have any insurance he stopped using it.  He subsequently got new hearing aids and at that time was noticed to have asymmetric hearing loss more so on the left side.  MRI of the internal auditory canal was done that showed no internal auditory canal or CP angle mass but there was asymmetric flow related signal within the intracranial internal carotid arteries and a CTA was recommended.  It showed narrowing of the right ICA with occlusion of the right terminal ICA.  He also had markedly diminutive left MCA with possible short segment occlusion of the left M2 and possible infectious or inflammatory vasculitis was raised.  Patient denies any past history of headache, focal weakness, numbness, confusion or any seizures.     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    Surgical History:  He  has a past surgical history that includes strabismus surgery (Right, 01/01/1972); groin surgery (01/01/1999); Colonoscopy - HIM Scan (N/A, 03/01/2008); and Colonoscopy - HIM Scan (N/A, 03/09/2017).     SOCIAL HISTORY     Reviewed, and he  reports that he has quit smoking. His smoking use included cigarettes. He smoked an average of 2 packs per day. He has never used smokeless tobacco. He reports that he does  "not currently use alcohol.     FAMILY HISTORY     Reviewed, and family history includes Arthritis in his father, mother, and sister; Cerebrovascular Disease in his mother; Diabetes in his maternal grandmother and paternal grandmother; Glaucoma in his mother; Hypertension in his father and mother; Mental Illness in his father, mother, and sister; Osteoporosis in his mother.     ALLERGIES     No Known Allergies      REVIEW OF SYSTEMS     A 12 point review of system was performed and was negative except as outlined in the history of present illness.     HOME MEDICATIONS     Current Outpatient Rx   Medication Sig Dispense Refill     Acetaminophen 325 MG CAPS Take 325-650 mg by mouth every 4 hours as needed       busPIRone (BUSPAR) 5 MG tablet Take 5 mg by mouth 3 times daily       famotidine (PEPCID) 20 MG tablet Take 1 tablet (20 mg) by mouth 2 times daily 90 tablet 1     loratadine (CLARITIN) 10 MG tablet Take 10 mg by mouth daily       omeprazole (PRILOSEC OTC) 20 MG EC tablet Take 2 tablets (40 mg) by mouth daily 180 tablet 1     traMADol (ULTRAM) 50 MG tablet TAKE 1 TABLET BY MOUTH TWICE DAILY AS NEEDED FOR SEVERE PAIN(7-10) 60 tablet 0     ammonium lactate (AMLACTIN) 12 % external cream Apply topically 2 times daily 385 g 3     lactase (LACTAID) 3000 UNIT tablet Take 1 tablet (3,000 Units) by mouth 3 times daily (with meals) 30 tablet 1         PHYSICAL EXAM     Vital signs  /74 (BP Location: Left arm, Patient Position: Sitting)   Pulse 61   Ht 1.702 m (5' 7\")   Wt 84.4 kg (186 lb)   BMI 29.13 kg/m      Weight:   186 lbs 0 oz    Middle-age gentleman who is alert and oriented vital signs are reviewed and documented in electronic medical record.  Neck supple.  Neurologically speech was normal.  Cranial nerves II through XII were intact except he is hard of hearing and uses hearing aid motor strength 5/5 reflexes symmetrical toes downgoing.  No dysmetria noted on finger-nose testing gait normal Romberg " negative.     PERTINENT DIAGNOSTIC STUDIES     Following studies were reviewed:     CTA HEAD AND NECK 10/23/2023  1.  CTA head: Circumferential narrowing of the right intracranial  internal carotid artery with occlusion of the right terminal internal  carotid artery. Markedly diminutive left MCA with possible short  segment occlusion of left M2 segment. Findings are suspicious for  infectious or inflammatory vasculitis. Recommend expedited neurology  consult.  2.  CTA neck: Circumferential narrowing of the right internal carotid  artery, suspicious for infectious or inflammatory vasculitis.  Recommend clinical correlation, neurology consult.    MRI INTERNAL AUDITORY CANAL 10/17/2023  No IAC or CP angle mass.     Asymmetric flow-related signal within the intracranial internal  carotid arteries. Recommend CTA head and neck for further assessment.     Probable mild microvascular ischemic change.     PERTINENT LABS  Following labs were reviewed:  Lab on 10/06/2023   Component Date Value Ref Range Status     Kochia/Firebush IgE 10/06/2023 <0.10  <0.10 KU(A)/L Final     Weed Nettle IgE 10/06/2023 <0.10  <0.10 KU(A)/L Final     Russian Thistle IgE 10/06/2023 <0.10  <0.10 KU(A)/L Final     Sheep [place] IgE 10/06/2023 <0.10  <0.10 KU(A)/L Final     Sagebrush [place] IgE 10/06/2023 <0.10  <0.10 KU(A)/L Final     Ragweed Short IgE 10/06/2023 <0.10  <0.10 KU(A)/L Final     Mugwort IgE 10/06/2023 <0.10  <0.10 KU(A)/L Final     Lamb's Quarter's IgE 10/06/2023 <0.10  <0.10 KU(A)/L Final     Giant Ragweed IgE 10/06/2023 <0.10  <0.10 KU(A)/L Final     English Plantain IgE 10/06/2023 <0.10  <0.10 KU(A)/L Final     [place] IgE 10/06/2023 <0.10  <0.10 KU(A)/L Final     [place] Tree IgE 10/06/2023 <0.10  <0.10 KU(A)/L Final     White [place] IgE 10/06/2023 <0.10  <0.10 KU(A)/L Final     Silver Birch IgE 10/06/2023 <0.10  <0.10 KU(A)/L Final     Red [place] IgE 10/06/2023 <0.10  <0.10 KU(A)/L Final     [place] (White) IgE 10/06/2023  <0.10  <0.10 KU(A)/L Final     Maple Tree IgE 10/06/2023 <0.10  <0.10 KU(A)/L Final     Elm Tree IgE 10/06/2023 <0.10  <0.10 KU(A)/L Final     [place] IgE 10/06/2023 <0.10  <0.10 KU(A)/L Final     [place], Tree IgE 10/06/2023 <0.10  <0.10 KU(A)/L Final     White [place], Tree IgE 10/06/2023 <0.10  <0.10 KU(A)/L Final     Penicillium notatum IgE 10/06/2023 <0.10  <0.10 KU(A)/L Final     Epicoccum purpurascens IgE 10/06/2023 <0.10  <0.10 KU(A)/L Final     Cladosporium herbarum IgE 10/06/2023 <0.10  <0.10 KU(A)/L Final     Aspergillis fumigatus IgE 10/06/2023 <0.10  <0.10 KU(A)/L Final     Alternaria alternata, Mold IgE 10/06/2023 <0.10  <0.10 KU(A)/L Final     Dermatophagoide pteronyssinus IgE 10/06/2023 <0.10  <0.10 KU(A)/L Final     Dermatophagoides farinae IgE 10/06/2023 <0.10  <0.10 KU(A)/L Final     Zaheer Grass IgE 10/06/2023 <0.10  <0.10 KU(A)/L Final     [place] (Orchard Grass) IgE 10/06/2023 <0.10  <0.10 KU(A)/L Final     [place] Grass IgE 10/06/2023 <0.10  <0.10 KU(A)/L Final     Dog Dander IgE 10/06/2023 <0.10  <0.10 KU(A)/L Final     Cat Dander IgE 10/06/2023 <0.10  <0.10 KU(A)/L Final        Total time spent for face to face visit, reviewing labs/imaging studies, counseling and coordination of care was: 1 Hour spent on the date of the encounter doing chart review, review of outside records, review of test results, interpretation of tests, patient visit, and documentation     This note was dictated using voice recognition software.  Any grammatical or context distortions are unintentional and inherent to the software.    No orders of the defined types were placed in this encounter.     New Prescriptions    No medications on file      Modified Medications    No medications on file                Again, thank you for allowing me to participate in the care of your patient.        Sincerely,        Buddy Ramirez MD

## 2023-10-31 NOTE — NURSING NOTE
Chief Complaint   Patient presents with    Cerebral vasculitis     Discuss CTA findings- loss of hearing      Sharita Phelps CMA on 10/31/2023 at 1:23 PM  [facility]

## 2023-11-02 ENCOUNTER — LAB (OUTPATIENT)
Dept: LAB | Facility: OTHER | Age: 53
End: 2023-11-02
Attending: PODIATRIST
Payer: COMMERCIAL

## 2023-11-02 ENCOUNTER — OFFICE VISIT (OUTPATIENT)
Dept: PODIATRY | Facility: OTHER | Age: 53
End: 2023-11-02
Attending: PODIATRIST
Payer: COMMERCIAL

## 2023-11-02 VITALS
OXYGEN SATURATION: 98 % | HEART RATE: 73 BPM | TEMPERATURE: 98 F | SYSTOLIC BLOOD PRESSURE: 121 MMHG | DIASTOLIC BLOOD PRESSURE: 75 MMHG

## 2023-11-02 DIAGNOSIS — I67.7 CEREBRAL VASCULITIS: ICD-10-CM

## 2023-11-02 DIAGNOSIS — L84 CALLUS OF FOOT: Primary | ICD-10-CM

## 2023-11-02 DIAGNOSIS — M20.41 ACQUIRED HAMMER TOE DEFORMITY OF LESSER TOE OF BOTH FEET: ICD-10-CM

## 2023-11-02 DIAGNOSIS — M20.42 ACQUIRED HAMMER TOE DEFORMITY OF LESSER TOE OF BOTH FEET: ICD-10-CM

## 2023-11-02 LAB
CHOLEST SERPL-MCNC: 172 MG/DL
HDLC SERPL-MCNC: 50 MG/DL
LDLC SERPL CALC-MCNC: 104 MG/DL
NONHDLC SERPL-MCNC: 122 MG/DL
TRIGL SERPL-MCNC: 89 MG/DL

## 2023-11-02 PROCEDURE — 86160 COMPLEMENT ANTIGEN: CPT

## 2023-11-02 PROCEDURE — 11055 PARING/CUTG B9 HYPRKER LES 1: CPT | Performed by: PODIATRIST

## 2023-11-02 PROCEDURE — 86036 ANCA SCREEN EACH ANTIBODY: CPT

## 2023-11-02 PROCEDURE — 86160 COMPLEMENT ANTIGEN: CPT | Mod: 91

## 2023-11-02 PROCEDURE — 86146 BETA-2 GLYCOPROTEIN ANTIBODY: CPT

## 2023-11-02 PROCEDURE — 80061 LIPID PANEL: CPT

## 2023-11-02 PROCEDURE — 86235 NUCLEAR ANTIGEN ANTIBODY: CPT

## 2023-11-02 PROCEDURE — 36415 COLL VENOUS BLD VENIPUNCTURE: CPT

## 2023-11-02 PROCEDURE — 86037 ANCA TITER EACH ANTIBODY: CPT

## 2023-11-02 PROCEDURE — 85613 RUSSELL VIPER VENOM DILUTED: CPT

## 2023-11-02 PROCEDURE — 99213 OFFICE O/P EST LOW 20 MIN: CPT | Mod: 25 | Performed by: PODIATRIST

## 2023-11-02 PROCEDURE — 86200 CCP ANTIBODY: CPT

## 2023-11-02 PROCEDURE — 85730 THROMBOPLASTIN TIME PARTIAL: CPT

## 2023-11-02 PROCEDURE — 85390 FIBRINOLYSINS SCREEN I&R: CPT | Mod: 26 | Performed by: PATHOLOGY

## 2023-11-02 PROCEDURE — 86038 ANTINUCLEAR ANTIBODIES: CPT

## 2023-11-02 PROCEDURE — 86225 DNA ANTIBODY NATIVE: CPT

## 2023-11-02 PROCEDURE — 86147 CARDIOLIPIN ANTIBODY EA IG: CPT

## 2023-11-02 PROCEDURE — 86235 NUCLEAR ANTIGEN ANTIBODY: CPT | Mod: 91

## 2023-11-02 ASSESSMENT — PAIN SCALES - GENERAL: PAINLEVEL: NO PAIN (0)

## 2023-11-02 NOTE — PROGRESS NOTES
Chief complaint: Patient presents with:  Musculoskeletal Problem: calluses      History of Present Illness: This 53 year old male is seen for paring of paring of painful caluses.    He has been using Ammonium Lactate cream daily which has helped keep his calluses less prominent. He has pain on the tip of the RIGHT third toe and the RIGHT plantar 1st metatarsal head. The RIGHT third toe has been curling a lot and causing him more pain and discomfort. He is wondering how he can treat the bending of the toe. He'd like to review other hammertoe treatment options again today. He would also like the callus pared on the RIGHT plantar 1st metatarsal head.    No further pedal complaints today.     Historically:  Patient was previously living in [place] and he says another podiatrist, Dr. Iraheta (at [facility]), gave him an anti-fungal for his toenails as well as a cream for his calluses. She previously pared his calluses, advised him to use antiperspirant spray on his feet, Aluminum chloride to stop the sweaty feet, antifungal creams (Terbinafine) in the evenings, and Urea Cream 20% on the calluses.      /75 (BP Location: Left arm, Patient Position: Sitting, Cuff Size: Adult Regular)   Pulse 73   Temp 98  F (36.7  C) (Tympanic)   SpO2 98%     Patient Active Problem List   Diagnosis    Central serous retinopathy    Cataract    ADHD    Anxiety    Bilateral hearing loss    GERD (gastroesophageal reflux disease)    HTN (hypertension)    ZOFIA (obstructive sleep apnea)       Past Surgical History:   Procedure Laterality Date    COLONOSCOPY - HIM SCAN N/A 03/01/2008    Colonoscopy lesion remove  3/2008    COLONOSCOPY - HIM SCAN N/A 03/09/2017    Colonoscopy screening 5 year recall due 3/2022    groin surgery  01/01/1999    STRABISMUS SURGERY Right 01/01/1972       Current Outpatient Medications   Medication    Acetaminophen 325 MG CAPS    ammonium lactate (AMLACTIN) 12 % external cream     busPIRone (BUSPAR) 5 MG tablet    famotidine (PEPCID) 20 MG tablet    lactase (LACTAID) 3000 UNIT tablet    loratadine (CLARITIN) 10 MG tablet    omeprazole (PRILOSEC OTC) 20 MG EC tablet    traMADol (ULTRAM) 50 MG tablet     No current facility-administered medications for this visit.        No Known Allergies    Family History   Problem Relation Age of Onset    Mental Illness Mother     Hypertension Mother     Cerebrovascular Disease Mother     Glaucoma Mother     Arthritis Mother     Osteoporosis Mother     Arthritis Father     Mental Illness Father     Hypertension Father     Arthritis Sister     Mental Illness Sister     Diabetes Maternal Grandmother     Diabetes Paternal Grandmother        Social History     Socioeconomic History    Marital status:      Spouse name: None    Number of children: None    Years of education: None    Highest education level: None   Tobacco Use    Smoking status: Former     Packs/day: 2.00     Types: Cigarettes    Smokeless tobacco: Never     Social Determinants of Health     Financial Resource Strain: Low Risk  (9/27/2023)    Financial Resource Strain     Within the past 12 months, have you or your family members you live with been unable to get utilities (heat, electricity) when it was really needed?: No   Food Insecurity: Low Risk  (9/27/2023)    Food Insecurity     Within the past 12 months, did you worry that your food would run out before you got money to buy more?: No     Within the past 12 months, did the food you bought just not last and you didn t have money to get more?: No   Transportation Needs: Low Risk  (9/27/2023)    Transportation Needs     Within the past 12 months, has lack of transportation kept you from medical appointments, getting your medicines, non-medical meetings or appointments, work, or from getting things that you need?: No   Interpersonal Safety: Low Risk  (9/27/2023)    Interpersonal Safety     Do you feel physically and emotionally safe where  you currently live?: Yes     Within the past 12 months, have you been hit, slapped, kicked or otherwise physically hurt by someone?: No     Within the past 12 months, have you been humiliated or emotionally abused in other ways by your partner or ex-partner?: No   Housing Stability: Low Risk  (9/27/2023)    Housing Stability     Do you have housing? : Yes     Are you worried about losing your housing?: No       ROS: 10 point ROS neg other than the symptoms noted above in the HPI.  EXAM  Constitutional: healthy, alert, and no distress    Psychiatric: mentation appears normal and affect normal/bright    VASCULAR:  -Dorsalis pedis pulse +2/4 b/l  -Posterior tibial pulse +2/4 b/l  -Capillary refill time < 3 seconds to b/l hallux  NEURO:  -Light touch sensation intact to b/l plantar forefoot  DERM:  -Skin temperature, texture and turgor WNL b/l  -Moderate hyperkeratotic lesion on the RIGHT plantar 1st metatarsal head    MSK:  -Pain on palpation to the RIGHT plantar 1st metatarsal head hyperkeratotic lesion   -DORSIFLEXION contracture to MTPJ 2-5 b/l with flexion contracture to PIPJ of digits 2-5 b/l  ---RIGHT third toe is a flexible contracture at the PIPJ and mildly flexible at the DIPJ  ---Mild Pain on palpation to the RIGHT distal third toe  -Muscle strength of ankles +5/5 for dorsiflexion, plantarflexion, ABDUction and ADDuction b/l    ============================================================    ASSESSMENT:  (L84) Callus of foot  (primary encounter diagnosis)    (M20.41,  M20.42) Acquired hammer toe deformity of lesser toe of both feet        PLAN:  -Patient evaluated and examined. Treatment options discussed with no educational barriers noted.    -Callus pared x 1 to the RIGHT plantar first metatarsal head without incident  ---Patient reminded that the callus will likely return due to the underlying, prominent bone causing the callus while the patient is walking.    -Ammonium Lactate will be ordered on  10/02/2023. Patient to continue using daily.    ------------------------    -Hammertoes:  ---Discussed etiology and treatment options for hammertoes again including in-office procedure options. The distal third toe is curling and giving him pain. A flexor tenotomy could be done in the office and would reduce pressure on the plantar third toe. He would be PWB for up to two weeks, but he could walk after the procedure. He is considering this option. He will call if the distal toe pain worsens. He just received a silicone toe sleeve on 11/02/2023. Patient may find more of these these at [facility] or [facility]. Patient should not wear the toe sleeve(s) at night, but only wear the sleeve in shoes and/or socks during the day. The sleeves are re-usable and hand washable until they wear out.    -This is an acute, uncomplicated illness/injury with OTC treatment options reviewed.    -Patient in agreement with the above treatment plan and all of patient's questions were answered.      Return to clinic as needed per patient request        Hollie Nina DPM

## 2023-11-03 LAB
ANA SER QL IF: NEGATIVE
ANCA AB PATTERN SER IF-IMP: NORMAL
B2 GLYCOPROT1 IGG SERPL IA-ACNC: <0.8 U/ML
B2 GLYCOPROT1 IGM SERPL IA-ACNC: <2.4 U/ML
C-ANCA TITR SER IF: NORMAL {TITER}
C3 SERPL-MCNC: 116 MG/DL (ref 81–157)
C4 SERPL-MCNC: 32 MG/DL (ref 13–39)
CARDIOLIPIN IGG SER IA-ACNC: <2 GPL-U/ML
CARDIOLIPIN IGG SER IA-ACNC: NEGATIVE
CARDIOLIPIN IGM SER IA-ACNC: <2 MPL-U/ML
CARDIOLIPIN IGM SER IA-ACNC: NEGATIVE
CCP AB SER IA-ACNC: 1.1 U/ML
DRVVT SCREEN RATIO: 0.77
ENA SS-A AB SER IA-ACNC: <0.5 U/ML
ENA SS-A AB SER IA-ACNC: NEGATIVE
ENA SS-B IGG SER IA-ACNC: <0.6 U/ML
ENA SS-B IGG SER IA-ACNC: NEGATIVE
INR PPP: 0.95 (ref 0.85–1.15)
LA PPP-IMP: NEGATIVE
LUPUS INTERPRETATION: NORMAL
PTT RATIO: 1
THROMBIN TIME: 17.5 SECONDS (ref 13–19)

## 2023-11-06 ENCOUNTER — OFFICE VISIT (OUTPATIENT)
Dept: INTERNAL MEDICINE | Facility: OTHER | Age: 53
End: 2023-11-06
Attending: INTERNAL MEDICINE
Payer: COMMERCIAL

## 2023-11-06 VITALS
TEMPERATURE: 98.4 F | WEIGHT: 189 LBS | RESPIRATION RATE: 20 BRPM | BODY MASS INDEX: 29.6 KG/M2 | DIASTOLIC BLOOD PRESSURE: 72 MMHG | HEART RATE: 89 BPM | OXYGEN SATURATION: 98 % | SYSTOLIC BLOOD PRESSURE: 116 MMHG

## 2023-11-06 DIAGNOSIS — Z23 NEED FOR PROPHYLACTIC VACCINATION AND INOCULATION AGAINST INFLUENZA: ICD-10-CM

## 2023-11-06 DIAGNOSIS — Z01.818 PRE-OP EXAM: Primary | ICD-10-CM

## 2023-11-06 DIAGNOSIS — G89.29 OTHER CHRONIC PAIN: ICD-10-CM

## 2023-11-06 DIAGNOSIS — I10 PRIMARY HYPERTENSION: ICD-10-CM

## 2023-11-06 DIAGNOSIS — K21.9 GASTROESOPHAGEAL REFLUX DISEASE WITHOUT ESOPHAGITIS: ICD-10-CM

## 2023-11-06 DIAGNOSIS — G47.33 OSA (OBSTRUCTIVE SLEEP APNEA): ICD-10-CM

## 2023-11-06 DIAGNOSIS — H91.8X3 OTHER SPECIFIED HEARING LOSS OF BOTH EARS: ICD-10-CM

## 2023-11-06 DIAGNOSIS — B07.8 COMMON WART: ICD-10-CM

## 2023-11-06 LAB
BASOPHILS # BLD AUTO: 0.1 10E3/UL (ref 0–0.2)
BASOPHILS NFR BLD AUTO: 1 %
CREAT SERPL-MCNC: 0.87 MG/DL (ref 0.67–1.17)
EGFRCR SERPLBLD CKD-EPI 2021: >90 ML/MIN/1.73M2
EOSINOPHIL # BLD AUTO: 0.3 10E3/UL (ref 0–0.7)
EOSINOPHIL NFR BLD AUTO: 2 %
ERYTHROCYTE [DISTWIDTH] IN BLOOD BY AUTOMATED COUNT: 12.3 % (ref 10–15)
HCT VFR BLD AUTO: 49 % (ref 40–53)
HGB BLD-MCNC: 16.5 G/DL (ref 13.3–17.7)
IMM GRANULOCYTES # BLD: 0 10E3/UL
IMM GRANULOCYTES NFR BLD: 0 %
LYMPHOCYTES # BLD AUTO: 3.7 10E3/UL (ref 0.8–5.3)
LYMPHOCYTES NFR BLD AUTO: 32 %
MCH RBC QN AUTO: 29.2 PG (ref 26.5–33)
MCHC RBC AUTO-ENTMCNC: 33.7 G/DL (ref 31.5–36.5)
MCV RBC AUTO: 87 FL (ref 78–100)
MONOCYTES # BLD AUTO: 0.7 10E3/UL (ref 0–1.3)
MONOCYTES NFR BLD AUTO: 6 %
NEUTROPHILS # BLD AUTO: 6.9 10E3/UL (ref 1.6–8.3)
NEUTROPHILS NFR BLD AUTO: 59 %
PLATELET # BLD AUTO: 292 10E3/UL (ref 150–450)
RBC # BLD AUTO: 5.65 10E6/UL (ref 4.4–5.9)
WBC # BLD AUTO: 11.7 10E3/UL (ref 4–11)

## 2023-11-06 PROCEDURE — 90686 IIV4 VACC NO PRSV 0.5 ML IM: CPT | Performed by: INTERNAL MEDICINE

## 2023-11-06 PROCEDURE — 85025 COMPLETE CBC W/AUTO DIFF WBC: CPT | Performed by: INTERNAL MEDICINE

## 2023-11-06 PROCEDURE — 99214 OFFICE O/P EST MOD 30 MIN: CPT | Mod: 25 | Performed by: INTERNAL MEDICINE

## 2023-11-06 PROCEDURE — 82565 ASSAY OF CREATININE: CPT | Performed by: INTERNAL MEDICINE

## 2023-11-06 PROCEDURE — 90471 IMMUNIZATION ADMIN: CPT | Performed by: INTERNAL MEDICINE

## 2023-11-06 PROCEDURE — 36415 COLL VENOUS BLD VENIPUNCTURE: CPT | Performed by: INTERNAL MEDICINE

## 2023-11-06 RX ORDER — TRAMADOL HYDROCHLORIDE 50 MG/1
TABLET ORAL
Qty: 60 TABLET | Refills: 0 | Status: SHIPPED | OUTPATIENT
Start: 2023-11-06 | End: 2024-02-14

## 2023-11-06 ASSESSMENT — PAIN SCALES - GENERAL: PAINLEVEL: NO PAIN (0)

## 2023-11-06 NOTE — PROGRESS NOTES
[facility]  [address]  [place] [address]  Phone: [phone]  Primary Provider: Laura Chavez  Pre-op Performing Provider: LAURA CHAVEZ      PREOPERATIVE EVALUATION:  Today's date: 11/6/2023    Natan is a 53 year old male who presents for a preoperative evaluation.      Surgical Information:  Surgery/Procedure: LP and Cerebral Angiogram   Surgery Location:   [facility] [place]   Surgeon: Dr. Lang Pablo or associate   Surgery Date: 11/13/23  Time of Surgery: TBD  Where patient plans to recover: At home with family  Fax number for surgical facility: [phone]    Assessment & Plan     The proposed surgical procedure is considered LOW risk.    Problem List Items Addressed This Visit          Nervous and Auditory    Bilateral hearing loss       Respiratory    ZOFIA (obstructive sleep apnea)       Digestive    GERD (gastroesophageal reflux disease)       Circulatory    HTN (hypertension)     Other Visit Diagnoses       Pre-op exam    -  Primary    Relevant Orders    Creatinine    CBC with platelets and differential (Completed)    Other chronic pain        Relevant Medications    traMADol (ULTRAM) 50 MG tablet    Common wart        Need for prophylactic vaccination and inoculation against influenza        Relevant Orders    INFLUENZA VACCINE >6 MONTHS (AFLURIA/FLUZONE) (Completed)                    - No identified additional risk factors other than previously addressed    Antiplatelet or Anticoagulation Medication Instructions:   - Patient is on no antiplatelet or anticoagulation medications.    Additional Medication Instructions:  Patient is to take all scheduled medications on the day of surgery    RECOMMENDATION:  APPROVAL GIVEN to proceed with proposed procedure, without further diagnostic evaluation.      30 minutes spent by me on the date of the encounter doing chart review, review of test results, interpretation of tests, patient visit, and  documentation       Subjective       HPI related to upcoming procedure:     Natan presents today for preoperative assessment for LP and cerebral angiogram.  He was seen by myself back in late September at which time he was given referral to podiatry due to a right foot callus.  Since that time he has been seen by podiatry and had the callus shaved off.  In addition he had a wart on his left thumb which was frozen but he requests repeat freezing of this today.  In addition he did have upcoming referrals for audiology where he was found to have  ASNHL and was subsequently noted to have asymmetric flow in the intracranial and carotid arteries.    Follow-up CT of the head without contrast was unremarkable.  However CTA of the head and neck did reveal narrowing of the right intracranial internal carotid artery with occlusion of the right terminal internal carotid artery marked diminutive left MCA with possible short segment occlusion of the left M2 segment suspicious for infection or inflammatory vasculitis.  In addition the neck CTA revealed circumferential narrowing of the right internal carotid artery suspicious for infectious or inflammatory vasculitis.    These findings were then followed up by a neurology evaluation where numerous lab tests including SSA, SSB complement 3 and 4, lupus anticoagulant, cardiolipin, beta-2 glycoprotein, ANCA and UMA along with double-stranded DNA and CCP testing were all completed and fairly unremarkable.  In addition lumbar puncture under fluoroscopy and IR referral for cerebral angiogram have been requested.  Patient does need preoperative assessment for these including a CBC and a creatinine.  In reviewing the paperwork no chest x-ray or EKG is being requested at this time.    Currently Natan is doing well.  He denies any chest pain or shortness of breath.  He is on no antiplatelet or DOAC at this time.          11/6/2023     1:39 PM   Preop Questions   1. Have you ever had a heart  attack or stroke? UNKNOWN -    2. Have you ever had surgery on your heart or blood vessels, such as a stent placement, a coronary artery bypass, or surgery on an artery in your head, neck, heart, or legs? No   3. Do you have chest pain with activity? No   4. Do you have a history of  heart failure? No   5. Do you currently have a cold, bronchitis or symptoms of other infection? No   6. Do you have a cough, shortness of breath, or wheezing? No   7. Do you or anyone in your family have previous history of blood clots? UNKNOWN -    8. Do you or does anyone in your family have a serious bleeding problem such as prolonged bleeding following surgeries or cuts? UNKNOWN -    9. Have you ever had problems with anemia or been told to take iron pills? No   10. Have you had any abnormal blood loss such as black, tarry or bloody stools? No   11. Have you ever had a blood transfusion? No   12. Are you willing to have a blood transfusion if it is medically needed before, during, or after your surgery? NO -    13. Have you or any of your relatives ever had problems with anesthesia? UNKNOWN -    14. Do you have sleep apnea, excessive snoring or daytime drowsiness? YES    14a. Do you have a CPAP machine? Yes   15. Do you have any artifical heart valves or other implanted medical devices like a pacemaker, defibrillator, or continuous glucose monitor? No   16. Do you have artificial joints? No   17. Are you allergic to latex? No     Health Care Directive:  Patient does not have a Health Care Directive or Living Will: Discussed advance care planning with patient; however, patient declined at this time.      Status of Chronic Conditions:  HYPERTENSION - Patient has longstanding history of HTN , currently requiring no blood pressure medications.    SLEEP PROBLEM - Patient has a longstanding history of excessive daytime somnolence.. Patient has tried OTC medications with limited success.     ADHD    Review of Systems  Constitutional,  neuro, ENT, endocrine, pulmonary, cardiac, gastrointestinal, genitourinary, musculoskeletal, integument and psychiatric systems are negative, except as otherwise noted.    Patient Active Problem List    Diagnosis Date Noted    GERD (gastroesophageal reflux disease) 10/30/2023     Priority: Medium    Anxiety 03/12/2021     Priority: Medium    ADHD 12/20/2019     Priority: Medium    Bilateral hearing loss 12/20/2019     Priority: Medium    HTN (hypertension) 12/04/2017     Priority: Medium    Central serous retinopathy 03/11/2015     Priority: Medium    Cataract 03/11/2015     Priority: Medium    ZOFIA (obstructive sleep apnea) 11/22/2013     Priority: Medium      Past Medical History:   Diagnosis Date    Chiari I malformation (H)      (central serous retinopathy)     LE    Nonsenile cataract      Past Surgical History:   Procedure Laterality Date    COLONOSCOPY - HIM SCAN N/A 03/01/2008    Colonoscopy lesion remove  3/2008    COLONOSCOPY - HIM SCAN N/A 03/09/2017    Colonoscopy screening 5 year recall due 3/2022    groin surgery  01/01/1999    STRABISMUS SURGERY Right 01/01/1972     Current Outpatient Medications   Medication Sig Dispense Refill    Acetaminophen 325 MG CAPS Take 325-650 mg by mouth every 4 hours as needed      ammonium lactate (AMLACTIN) 12 % external cream Apply topically 2 times daily 385 g 3    busPIRone (BUSPAR) 5 MG tablet Take 5 mg by mouth 3 times daily      famotidine (PEPCID) 20 MG tablet Take 1 tablet (20 mg) by mouth 2 times daily 90 tablet 1    lactase (LACTAID) 3000 UNIT tablet Take 1 tablet (3,000 Units) by mouth 3 times daily (with meals) 30 tablet 1    loratadine (CLARITIN) 10 MG tablet Take 10 mg by mouth daily      omeprazole (PRILOSEC OTC) 20 MG EC tablet Take 2 tablets (40 mg) by mouth daily 180 tablet 1    traMADol (ULTRAM) 50 MG tablet TAKE 1 TABLET BY MOUTH TWICE DAILY AS NEEDED FOR SEVERE PAIN(7-10) 60 tablet 0       No Known Allergies     Social History     Tobacco Use     Smoking status: Former     Packs/day: 2     Types: Cigarettes    Smokeless tobacco: Never   Substance Use Topics    Alcohol use: Not Currently     Family History   Problem Relation Age of Onset    Mental Illness Mother     Hypertension Mother     Cerebrovascular Disease Mother     Glaucoma Mother     Arthritis Mother     Osteoporosis Mother     Arthritis Father     Mental Illness Father     Hypertension Father     Arthritis Sister     Mental Illness Sister     Diabetes Maternal Grandmother     Diabetes Paternal Grandmother      History   Drug Use Not on file         Objective     /72 (BP Location: Right arm, Patient Position: Sitting, Cuff Size: Adult Regular)   Pulse 89   Temp 98.4  F (36.9  C) (Tympanic)   Resp 20   Wt 85.7 kg (189 lb)   SpO2 98%   BMI 29.60 kg/m      Physical Exam    GENERAL APPEARANCE: healthy, alert and no distress     EYES: EOMI,  PERRL     RESP: lungs clear to auscultation - no rales, rhonchi or wheezes     CV: regular rates and rhythm, normal S1 S2, no S3 or S4 and no murmur, click or rub     ABDOMEN:  soft, nontender, no HSM or masses and bowel sounds normal     MS: extremities normal- no gross deformities noted, no evidence of inflammation in joints, FROM in all extremities.     SKIN: Left thumb wart     PSYCH: mentation appears normal. and affect normal/bright    Recent Labs   Lab Test 11/02/23  0958 03/29/23  1006   HGB  --  16.7   PLT  --  254   INR 0.95  --    NA  --  142   POTASSIUM  --  4.3   CR  --  0.79        Diagnostics:  Labs pending at this time.  Results will be reviewed when available.   No EKG required for low risk surgery (cataract, skin procedure, breast biopsy, etc).    Revised Cardiac Risk Index (RCRI):  The patient has the following serious cardiovascular risks for perioperative complications:   - No serious cardiac risks = 0 points     RCRI Interpretation: 0 points: Class I (very low risk - 0.4% complication rate)         Signed Electronically by: Avila  ANGELA Rivera, DO  Copy of this evaluation report is provided to requesting physician.

## 2023-11-07 LAB — DSDNA AB SER-ACNC: 0.8 IU/ML

## 2023-11-10 ENCOUNTER — ALLIED HEALTH/NURSE VISIT (OUTPATIENT)
Dept: AUDIOLOGY | Facility: OTHER | Age: 53
End: 2023-11-10
Attending: AUDIOLOGIST
Payer: COMMERCIAL

## 2023-11-10 DIAGNOSIS — H90.3 SENSORINEURAL HEARING LOSS (SNHL) OF BOTH EARS: Primary | ICD-10-CM

## 2023-11-10 PROCEDURE — V5299 HEARING SERVICE: HCPCS

## 2023-11-10 NOTE — PROGRESS NOTES
AUDIOLOGY ASSISTANT REPORT    SUBJECTIVE:Zaheer Mcgee is a 53 year old male who was seen in the Audiology Clinic at the [facility] on 11/10/2023  for a follow-up review of their hearing aids.  The patient has been seen previously in this clinic and was fit with O[employer]on Real 2 miniRITE R hearing aids on 10/20/2023.  Zaheer reports domes not staying in ears.    OBJECTIVE:   A follow-up review was performed.  The hearing aid conformity evaluation was previously completed by the audiologist. Based on patient report, audiologist made adjustments to fit the patient needs.    Listening goals reviewed and patient reports they are met to satisfaction.      Reviewed 45 day trial period, care, cleaning (no water, dry brush), batteries (size Rechargeable) low-battery signal), aid insertion/removal, volume adjustment (if applicable), user booklet, warranty information, storage cases, and other hearing aid details.       ASSESSMENT: A follow-up appointment for hearing aid(s) was completed today. Changes to hearing aid if shown was completed as outlined above.     PLAN:Zaheer will return for follow-up as needed, or in 12 months.  The patient reports satisfaction and wants to keep the hearing aids.The patient will return for hearing aid checks as needed and in 12 months.  Please call this clinic with any questions regarding today s appointment.      Maida Golden  Audiology Assistant  [facility]  [phone]

## 2023-11-13 PROBLEM — I67.5 MOYAMOYA DISEASE: Status: ACTIVE | Noted: 2023-11-13

## 2023-11-20 ENCOUNTER — TELEPHONE (OUTPATIENT)
Dept: NEUROLOGY | Facility: CLINIC | Age: 53
End: 2023-11-20
Payer: COMMERCIAL

## 2023-11-20 DIAGNOSIS — I67.5 MOYAMOYA DISEASE: Primary | ICD-10-CM

## 2023-11-20 NOTE — TELEPHONE ENCOUNTER
Nursing cannot give diagnosis/possible diagnosis to patient, needs to come from provider. Would you prefer to call pt or discuss in an appt?     Sorin

## 2023-11-20 NOTE — TELEPHONE ENCOUNTER
Called and discussed with patient.  Informed him his angiogram suggest moyamoya disease and my recommendation about neurosurgery consult for possible EC IC bypass

## 2023-11-20 NOTE — TELEPHONE ENCOUNTER
Lumbar puncture results are normal.  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  I would recommend neurovascular surgery as he might need external carotid to internal carotid bypass.  See orders.  If patient wants to discuss further he can be added onto my schedule    Lab work for CNS vasculitis was also normal.    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

## 2023-11-20 NOTE — TELEPHONE ENCOUNTER
[facility]    Phone Message    May a detailed message be left on voicemail: yes     Reason for Call: Requesting Results     Name/type of test: Lumbar puncture  Date of test: 11/13/23  Was test done at a location other than [facility] (Please fill in the location if not [facility])?: Yes: Pt had this performed at [facility].     Please call pt back to advise before 2:45pm during weekdays due to driving for work.     Action Taken: Message routed to:  Other: [facility]     Travel Screening: Not Applicable

## 2023-11-20 NOTE — TELEPHONE ENCOUNTER
Pt asking for LP results, results are available under care everywhere ([facility]) as pt had done at [facility] 11/13.     Please also advise if pt needs follow up    Sorin Schmitt RN, BSN  [facility]

## 2023-11-28 NOTE — TELEPHONE ENCOUNTER
Records Requested     November 28, 2023 3:17 PM   [id number]   Facility  [facility]   Outcome 3:21 pm Sent request for imaging to be pushed to PACS. -MORGAN Lao on 12/8/2023 at 9:37 AM Imaging resolved into PACS. -MORGAN     RECORDS RECEIVED FROM: Internal    REASON FOR VISIT: Moyamoya disease [I67.5]   Date of Appt: 12/12/23 @ 9:20 am    NOTES (FOR ALL VISITS) STATUS DETAILS   OFFICE NOTE from referring provider Received Pt Self Referral   OFFICE NOTE from other specialist Internal 10/31/23 Buddy Ramirez MD  [email or web address]     MEDICATION LIST Internal    IMAGING  (FOR ALL VISITS)     IR PACS [facility]  11/21/23 IR Carotid Cerebral Angio  11/21/23 IR Lumbar Puncture     MRI (HEAD, NECK, SPINE) Internal [facility]  10/17/23 MR Internal Auditory Canal     CT (HEAD, NECK, SPINE) Internal [facility]  10/23/23 CTA Head Neck  10/23/23 CT Head

## 2023-12-12 ENCOUNTER — PRE VISIT (OUTPATIENT)
Dept: NEUROSURGERY | Facility: CLINIC | Age: 53
End: 2023-12-12

## 2023-12-12 ENCOUNTER — OFFICE VISIT (OUTPATIENT)
Dept: NEUROSURGERY | Facility: CLINIC | Age: 53
End: 2023-12-12
Attending: PSYCHIATRY & NEUROLOGY
Payer: COMMERCIAL

## 2023-12-12 VITALS
HEIGHT: 67 IN | DIASTOLIC BLOOD PRESSURE: 74 MMHG | SYSTOLIC BLOOD PRESSURE: 127 MMHG | WEIGHT: 189 LBS | OXYGEN SATURATION: 97 % | HEART RATE: 85 BPM | BODY MASS INDEX: 29.66 KG/M2

## 2023-12-12 DIAGNOSIS — I67.5 MOYAMOYA DISEASE: ICD-10-CM

## 2023-12-12 PROCEDURE — 99204 OFFICE O/P NEW MOD 45 MIN: CPT | Mod: GC | Performed by: NEUROLOGICAL SURGERY

## 2023-12-12 NOTE — LETTER
12/12/2023       RE: Zaheer Mcgee  [address] [facility] [address]       Dear Colleague,    Thank you for referring your patient, Zaheer Mcgee, to the [facility] [place] at [facility]. Please see a copy of my visit note below.    Subjective:: Zaheer Mcgee is a 53 year old male with a past medical history of ADHD, anxiety, hypertension, obstructive sleep apnea who had an MRI to evaluate for asymmetric hearing loss and was noted to have asymmetric flow related signal within the internal carotid arteries.  He has narrowing right internal carotid artery with occlusion of the right terminal internal carotid artery and diminutive left middle cerebral artery with a short segment occlusion on the left M2.  The findings were suggestive of a potential CNS vasculitis for which he is being evaluated and presents to clinic for diagnostic cerebral angiogram.  He denies any neurologic symptoms including weakness, numbness, vision loss, dysarthria, aphasia.  He does note some difficulty with memory that is short-term and some brain fog.    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  He is a former smoker and was smoking 2 packs/day but quit for about 13 years he recently resumed smoking a couple weeks ago but then quit 1 week ago.  He has hypertension with blood pressures running in the 130s to 140s, hyperlipidemia.    Current workup includes SSA, SSB, CCP, double-stranded DNA, ANCA, UMA, cardiolipin, beta-2 glycoprotein, LP with CSF all of which have been negative.  .    Past medical history:   Past Medical History:   Diagnosis Date    Chiari I malformation (H)      (central serous retinopathy)     LE    Nonsenile cataract         Current outpatient Medication list:   Current Outpatient Medications:      Acetaminophen 325 MG CAPS, Take 325-650 mg by mouth every 4 hours as needed, Disp: , Rfl:     ammonium lactate (AMLACTIN) 12 % external cream, Apply topically 2 times daily, Disp: 385 g, Rfl: 3    busPIRone (BUSPAR) 5 MG tablet, Take 5 mg by mouth 3 times daily, Disp: , Rfl:     famotidine (PEPCID) 20 MG tablet, Take 1 tablet (20 mg) by mouth 2 times daily, Disp: 90 tablet, Rfl: 1    lactase (LACTAID) 3000 UNIT tablet, Take 1 tablet (3,000 Units) by mouth 3 times daily (with meals), Disp: 30 tablet, Rfl: 1    loratadine (CLARITIN) 10 MG tablet, Take 10 mg by mouth daily, Disp: , Rfl:     omeprazole (PRILOSEC OTC) 20 MG EC tablet, Take 2 tablets (40 mg) by mouth daily, Disp: 180 tablet, Rfl: 1    traMADol (ULTRAM) 50 MG tablet, TAKE 1 TABLET BY MOUTH TWICE DAILY AS NEEDED FOR SEVERE PAIN(7-10), Disp: 60 tablet, Rfl: 0      Family history:   Family History   Problem Relation Age of Onset    Mental Illness Mother     Hypertension Mother     Cerebrovascular Disease Mother     Glaucoma Mother     Arthritis Mother     Osteoporosis Mother     Arthritis Father     Mental Illness Father     Hypertension Father     Arthritis Sister     Mental Illness Sister     Diabetes Maternal Grandmother     Diabetes Paternal Grandmother        Social history:   Social History     Tobacco Use    Smoking status: Former     Packs/day: 2     Types: Cigarettes    Smokeless tobacco: Never   Substance Use Topics    Alcohol use: Not Currently       Allergies:  No Known Allergies    Review of Systems: 5 point ROS performed and negative except for detailed above    Objective:    Physical exam:  There were no vitals taken for this visit.  Constitutional: Awake, no acute distress  HENT: normcephalic,   Respiratory: normal rate, no acute distress  Neuro:  Mental status: awake, oriented   CN: EOMI, face movements symmetric, mild dysarthria, equal shoulder shrug, tongue midline  Motor: 5/5 strength in upper and lower extremities bilaterally  Sensory:  equal to light touch bilaterally in upper and lower extremities  Coordination: Normal finger to nose in upper extremities bilaterally, normal heel-to-shin in lower extremities bilaterally    Imaging Reviewed:        MRI IAC 10/2023: Scattered foci of T2/FLAIR hyperintense signal within the  supratentorial white matter are nonspecific. No abnormal intracranial  enhancement or concerning T2* gradient susceptibility is identified.  No restricted diffusion is present.     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   2.  Specifically, there is a tapering occlusion of the proximal supraclinoid segment on the right, with antegrade filling of the ophthalmic artery, but no antegrade filling of the mid and distal supraclinoid internal carotid.   3.  Large right posterior communicating artery, which does fill the distal supraclinoid segment and a normal appearing M1 segment, with brisk antegrade filling of middle cerebral artery branches.   4.  On the left, complete occlusion of the M1 trunk. There are hypertrophied lenticulostriates which do abut some antegrade collateral filling of the anterior temporal branch.   5.  Large, hypertrophied anterior cerebral arteries which fill entirely from the left, and provide additional leptomeningeal collateral filling of the left middle cerebral artery, which fills retrogradely.       Assessment:  Zaheer Mcgee  is a 53 year old male who presents for moyamoya disease he had a diagnostic cerebral angiogram that showed tapering of the proximal supraclinoid segment of the right internal carotid artery with antegrade filling of the ophthalmic artery.  There is hypertrophied anterior cerebral arteries which fill from the left and leptomeningeal collateral filling of the left MCA.  There is occlusion of the M1 trunk and there are hypertrophied lenticulostriate \which has some collateral filling the anterior temporal branch.   His most recent MRI  does show some small areas of white matter disease with questionable embolic hit along the left anterior cerebral artery territory.  His angiographic imaging does show that there is a left M1 occlusion given the lack of significant left MCA stroke with suggest that this occlusion was a slow occlusion that occurred over time with the development of collaterals to compensate for the loss.  Further evaluation of vascular reserve is needed with the CT perfusion with Diamox study.    Thank you for this referral, for any questions or concerns please don't hesitate to contact us.     Plan:  - CT perfusion with diamox study in [place] and follow-up virtual clinic visit after imaging.  -Long term goals, BP <130/80, LDL <70, A1c <7  - Aspirin 81mg therapy is recommended from a cardiovascular risk factors standpoint          Again, thank you for allowing me to participate in the care of your patient.      Sincerely,    Sergio Bhakta MD

## 2023-12-12 NOTE — PROGRESS NOTES
Subjective:: Zaheer Mcgee is a 53 year old male with a past medical history of ADHD, anxiety, hypertension, obstructive sleep apnea who had an MRI to evaluate for asymmetric hearing loss and was noted to have asymmetric flow related signal within the internal carotid arteries.  He has narrowing right internal carotid artery with occlusion of the right terminal internal carotid artery and diminutive left middle cerebral artery with a short segment occlusion on the left M2.  The findings were suggestive of a potential CNS vasculitis for which he is being evaluated and presents to clinic for diagnostic cerebral angiogram.  He denies any neurologic symptoms including weakness, numbness, vision loss, dysarthria, aphasia.  He does note some difficulty with memory that is short-term and some brain fog.    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  He is a former smoker and was smoking 2 packs/day but quit for about 13 years he recently resumed smoking a couple weeks ago but then quit 1 week ago.  He has hypertension with blood pressures running in the 130s to 140s, hyperlipidemia.    Current workup includes SSA, SSB, CCP, double-stranded DNA, ANCA, UMA, cardiolipin, beta-2 glycoprotein, LP with CSF all of which have been negative.  .    Past medical history:   Past Medical History:   Diagnosis Date    Chiari I malformation (H)      (central serous retinopathy)     LE    Nonsenile cataract         Current outpatient Medication list:   Current Outpatient Medications:     Acetaminophen 325 MG CAPS, Take 325-650 mg by mouth every 4 hours as needed, Disp: , Rfl:     ammonium lactate (AMLACTIN) 12 % external cream, Apply topically 2 times daily, Disp: 385 g, Rfl: 3    busPIRone (BUSPAR) 5 MG tablet, Take 5 mg by mouth 3 times daily, Disp: , Rfl:     famotidine (PEPCID) 20 MG tablet, Take 1  tablet (20 mg) by mouth 2 times daily, Disp: 90 tablet, Rfl: 1    lactase (LACTAID) 3000 UNIT tablet, Take 1 tablet (3,000 Units) by mouth 3 times daily (with meals), Disp: 30 tablet, Rfl: 1    loratadine (CLARITIN) 10 MG tablet, Take 10 mg by mouth daily, Disp: , Rfl:     omeprazole (PRILOSEC OTC) 20 MG EC tablet, Take 2 tablets (40 mg) by mouth daily, Disp: 180 tablet, Rfl: 1    traMADol (ULTRAM) 50 MG tablet, TAKE 1 TABLET BY MOUTH TWICE DAILY AS NEEDED FOR SEVERE PAIN(7-10), Disp: 60 tablet, Rfl: 0      Family history:   Family History   Problem Relation Age of Onset    Mental Illness Mother     Hypertension Mother     Cerebrovascular Disease Mother     Glaucoma Mother     Arthritis Mother     Osteoporosis Mother     Arthritis Father     Mental Illness Father     Hypertension Father     Arthritis Sister     Mental Illness Sister     Diabetes Maternal Grandmother     Diabetes Paternal Grandmother        Social history:   Social History     Tobacco Use    Smoking status: Former     Packs/day: 2     Types: Cigarettes    Smokeless tobacco: Never   Substance Use Topics    Alcohol use: Not Currently       Allergies:  No Known Allergies    Review of Systems: 5 point ROS performed and negative except for detailed above    Objective:    Physical exam:  There were no vitals taken for this visit.  Constitutional: Awake, no acute distress  HENT: normcephalic,   Respiratory: normal rate, no acute distress  Neuro:  Mental status: awake, oriented   CN: EOMI, face movements symmetric, mild dysarthria, equal shoulder shrug, tongue midline  Motor: 5/5 strength in upper and lower extremities bilaterally  Sensory: equal to light touch bilaterally in upper and lower extremities  Coordination: Normal finger to nose in upper extremities bilaterally, normal heel-to-shin in lower extremities bilaterally    Imaging Reviewed:        MRI IAC 10/2023: Scattered foci of T2/FLAIR hyperintense signal within the  supratentorial white matter are  nonspecific. No abnormal intracranial  enhancement or concerning T2* gradient susceptibility is identified.  No restricted diffusion is present.     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   2.  Specifically, there is a tapering occlusion of the proximal supraclinoid segment on the right, with antegrade filling of the ophthalmic artery, but no antegrade filling of the mid and distal supraclinoid internal carotid.   3.  Large right posterior communicating artery, which does fill the distal supraclinoid segment and a normal appearing M1 segment, with brisk antegrade filling of middle cerebral artery branches.   4.  On the left, complete occlusion of the M1 trunk. There are hypertrophied lenticulostriates which do abut some antegrade collateral filling of the anterior temporal branch.   5.  Large, hypertrophied anterior cerebral arteries which fill entirely from the left, and provide additional leptomeningeal collateral filling of the left middle cerebral artery, which fills retrogradely.       Assessment:  Zaheer Mcgee  is a 53 year old male who presents for moyamoya disease he had a diagnostic cerebral angiogram that showed tapering of the proximal supraclinoid segment of the right internal carotid artery with antegrade filling of the ophthalmic artery.  There is hypertrophied anterior cerebral arteries which fill from the left and leptomeningeal collateral filling of the left MCA.  There is occlusion of the M1 trunk and there are hypertrophied lenticulostriate \which has some collateral filling the anterior temporal branch.   His most recent MRI does show some small areas of white matter disease with questionable embolic hit along the left anterior cerebral artery territory.  His angiographic imaging does show that there is a left M1 occlusion given the lack of significant left MCA stroke with suggest that this occlusion was a slow occlusion that occurred over  time with the development of collaterals to compensate for the loss.  Further evaluation of vascular reserve is needed with the CT perfusion with Diamox study.    Thank you for this referral, for any questions or concerns please don't hesitate to contact us.     Plan:  - CT perfusion with diamox study in [place] and follow-up virtual clinic visit after imaging.  -Long term goals, BP <130/80, LDL <70, A1c <7  - Aspirin 81mg therapy is recommended from a cardiovascular risk factors standpoint    Jennifer Hatch MD  Endovascular Surgical Neuroradiology Fellow  [facility]  [phone]    Endovascular Surgical Neuroradiology staff is Dr. Bhakta

## 2023-12-12 NOTE — PATIENT INSTRUCTIONS
CT Perfusion Test with and without Diamox, can be completed at [facility].      Follow up with Dr Bhakta after CT Perfusion Testing in 4 weeks.    Call Paradise RN  Neurosurgery Care Coordinator with questions/concerns     Thank you for using [facility]

## 2023-12-14 ENCOUNTER — TELEPHONE (OUTPATIENT)
Dept: NEUROSURGERY | Facility: CLINIC | Age: 53
End: 2023-12-14
Payer: COMMERCIAL

## 2023-12-14 NOTE — TELEPHONE ENCOUNTER
Spoke with patient, patient has Imaging set up for CTA Angiogram Head with and without Diamox for 12/15/23 @ [facility], GEETHA Gordillo unable to complete.  Patient had one mishap with scheduling but it is now scheduled and OV with Dr Bhakta on 1/9/24.  Patient states was frustrated but everything is scheduled at this time and all is good.  Patient has my name and number if needed.    Voices understanding

## 2023-12-15 ENCOUNTER — HOSPITAL ENCOUNTER (OUTPATIENT)
Dept: CT IMAGING | Facility: CLINIC | Age: 53
Discharge: HOME | End: 2023-12-15
Attending: NEUROLOGICAL SURGERY | Admitting: NEUROLOGICAL SURGERY
Payer: COMMERCIAL

## 2023-12-15 VITALS — SYSTOLIC BLOOD PRESSURE: 138 MMHG | HEART RATE: 84 BPM | DIASTOLIC BLOOD PRESSURE: 83 MMHG

## 2023-12-15 DIAGNOSIS — I67.5 MOYAMOYA DISEASE: ICD-10-CM

## 2023-12-15 PROCEDURE — 250N000011 HC RX IP 250 OP 636: Performed by: NEUROLOGICAL SURGERY

## 2023-12-15 PROCEDURE — 70496 CT ANGIOGRAPHY HEAD: CPT

## 2023-12-15 PROCEDURE — 70496 CT ANGIOGRAPHY HEAD: CPT | Mod: 26 | Performed by: RADIOLOGY

## 2023-12-15 PROCEDURE — 250N000011 HC RX IP 250 OP 636: Mod: JZ | Performed by: NEUROLOGICAL SURGERY

## 2023-12-15 RX ORDER — IOPAMIDOL 755 MG/ML
80 INJECTION, SOLUTION INTRAVASCULAR ONCE
Status: COMPLETED | OUTPATIENT
Start: 2023-12-15 | End: 2023-12-15

## 2023-12-15 RX ORDER — ACETAZOLAMIDE 500 MG/5ML
1000 INJECTION, POWDER, LYOPHILIZED, FOR SOLUTION INTRAVENOUS ONCE
Status: COMPLETED | OUTPATIENT
Start: 2023-12-15 | End: 2023-12-15

## 2023-12-15 RX ADMIN — ACETAZOLAMIDE SODIUM 1000 MG: 500 INJECTION, POWDER, LYOPHILIZED, FOR SOLUTION INTRAVENOUS at 08:35

## 2023-12-15 RX ADMIN — IOPAMIDOL 80 ML: 755 INJECTION, SOLUTION INTRAVENOUS at 08:24

## 2023-12-18 ENCOUNTER — TELEPHONE (OUTPATIENT)
Dept: NEUROSURGERY | Facility: CLINIC | Age: 53
End: 2023-12-18
Payer: COMMERCIAL

## 2023-12-18 NOTE — TELEPHONE ENCOUNTER
Patient calling for report on recent Imaging  CTA ANGIOGRAM HEAD PERFUSION WITH DIAMOX 12/15/2023 8:51 AM     PRE RIVAS REPORT  2. No change in cerebral blood flow (CBF) in pre and post-Diamox  study.     Patient appointment with Dr Bhakta on 1/9/to discuss imaging.    Voices understanding

## 2024-01-09 ENCOUNTER — OFFICE VISIT (OUTPATIENT)
Dept: NEUROSURGERY | Facility: CLINIC | Age: 54
End: 2024-01-09
Payer: COMMERCIAL

## 2024-01-09 VITALS
OXYGEN SATURATION: 98 % | WEIGHT: 185 LBS | SYSTOLIC BLOOD PRESSURE: 129 MMHG | HEART RATE: 86 BPM | HEIGHT: 67 IN | DIASTOLIC BLOOD PRESSURE: 76 MMHG | BODY MASS INDEX: 29.03 KG/M2

## 2024-01-09 DIAGNOSIS — I67.5 MOYAMOYA DISEASE: Primary | ICD-10-CM

## 2024-01-09 PROCEDURE — 99213 OFFICE O/P EST LOW 20 MIN: CPT | Mod: GC | Performed by: NEUROLOGICAL SURGERY

## 2024-01-09 ASSESSMENT — PAIN SCALES - GENERAL: PAINLEVEL: NO PAIN (0)

## 2024-01-09 NOTE — PROGRESS NOTES
[facility] NEUROSURGERY CLINIC [address]  [address] [facility] [address]  Phone: [phone]  Fax: [phone]    Neuroendovascular Clinic Note:    Reason for clinic visit: Follow up visit for Ch Ch Disease after recent perfusion study      History of present illness:    53 year old male with known medical history for moyamoya disease.  Patient had diagnostic cerebral angiogram after suspecting Ch Ch changes as part of hearing evaluation in 11/21/23 that showed significant Ch Ch changes at outside hospital that showed tapering of the proximal supraclinoid segment of the right internal carotid artery with anterograde filling of the ophthalmic artery.  There is hypertrophied anterior cerebral arteries which fill from the left and leptomeningeal collateral filling of the left MCA.  There is occlusion of the M1 trunk and there are hypertrophied lenticulostriate \which has some collateral filling the anterior temporal branch.   His most recent MRI does show some small areas of white matter disease with questionable embolic hit along the left anterior cerebral artery territory.  His angiographic imaging does show that there is a left M1 occlusion given the lack of significant left MCA stroke with suggest that this occlusion was a slow occlusion that occurred over time with the development of collaterals to compensate for the loss.      Patient underwent perfusion CT scan which did not show perfusion deficits. He is clinically doing well. 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    Past Medical History:  Past Medical History:   Diagnosis Date    Chiari I malformation (H)      (central serous retinopathy)     LE    Nonsenile cataract        Past Surgical History:  Past Surgical History:   Procedure Laterality Date    COLONOSCOPY - HIM SCAN N/A 03/01/2008    Colonoscopy lesion remove  3/2008    COLONOSCOPY - HIM SCAN N/A 03/09/2017     Colonoscopy screening 5 year recall due 3/2022    groin surgery  01/01/1999    IR CAROTID CEREBRAL ANGIOGRAM BILATERAL  11/13/2023    STRABISMUS SURGERY Right 01/01/1972       Social History:  Social History     Socioeconomic History    Marital status:      Spouse name: Not on file    Number of children: Not on file    Years of education: Not on file    Highest education level: Not on file   Occupational History    Not on file   Tobacco Use    Smoking status: Former     Packs/day: 2     Types: Cigarettes    Smokeless tobacco: Never   Substance and Sexual Activity    Alcohol use: Not Currently    Drug use: Not on file    Sexual activity: Not on file   Other Topics Concern    Not on file   Social History Narrative    Not on file     Social Determinants of Health     Financial Resource Strain: Low Risk  (9/27/2023)    Financial Resource Strain     Within the past 12 months, have you or your family members you live with been unable to get utilities (heat, electricity) when it was really needed?: No   Food Insecurity: Low Risk  (9/27/2023)    Food Insecurity     Within the past 12 months, did you worry that your food would run out before you got money to buy more?: No     Within the past 12 months, did the food you bought just not last and you didn t have money to get more?: No   Transportation Needs: Low Risk  (9/27/2023)    Transportation Needs     Within the past 12 months, has lack of transportation kept you from medical appointments, getting your medicines, non-medical meetings or appointments, work, or from getting things that you need?: No   Physical Activity: Not on file   Stress: Not on file   Social Connections: Not on file   Interpersonal Safety: Low Risk  (9/27/2023)    Interpersonal Safety     Do you feel physically and emotionally safe where you currently live?: Yes     Within the past 12 months, have you been hit, slapped, kicked or otherwise physically hurt by someone?: No     Within the past 12  months, have you been humiliated or emotionally abused in other ways by your partner or ex-partner?: No   Housing Stability: Low Risk  (9/27/2023)    Housing Stability     Do you have housing? : Yes     Are you worried about losing your housing?: No       Family History:  Family History   Problem Relation Age of Onset    Mental Illness Mother     Hypertension Mother     Cerebrovascular Disease Mother     Glaucoma Mother     Arthritis Mother     Osteoporosis Mother     Arthritis Father     Mental Illness Father     Hypertension Father     Arthritis Sister     Mental Illness Sister     Diabetes Maternal Grandmother     Diabetes Paternal Grandmother        Home Medications:  Current Outpatient Medications   Medication    Acetaminophen 325 MG CAPS    ammonium lactate (AMLACTIN) 12 % external cream    busPIRone (BUSPAR) 5 MG tablet    famotidine (PEPCID) 20 MG tablet    lactase (LACTAID) 3000 UNIT tablet    loratadine (CLARITIN) 10 MG tablet    omeprazole (PRILOSEC OTC) 20 MG EC tablet    traMADol (ULTRAM) 50 MG tablet     No current facility-administered medications for this visit.       Allergies:  No Known Allergies    Physical Examination:  Vitals: There were no vitals taken for this visit.  General: Adult patent sitting comfortably in chair  HEENT: NC/AT, no icterus, op pink and moist  Cardiac: RRR  Chest: non-labored on RA  Abdomen: S/NT/ND  Extremities: Warm, no edema  Skin: No rash or lesion   Psych: Mood pleasant, affect congruent  Neuro:  Mental status: Awake, alert, attentive, oriented to self, time, place, and circumstance. Language is fluent and coherent with intact comprehension of complex commands, naming and repetition.  Cranial nerves: VFF, PERRL, conjugate gaze, EOMI, facial sensation intact, face symmetric, shoulder shrug strong, tongue/uvula midline, no dysarthria.   Motor: Normal bulk and tone. No abnormal movements. 5/5 strength in 4/4 extremities.   Sensory: Intact to light touch, pin, vibration,  and proprioception  Coordination: FNF and HS without ataxia or dysmetria. Rapid alternating movements intact.   Gait: Normal width, stride length, turn, and arm swing. Station normal. Heel, toe, and tandem walk intact.      Laboratory findings:  Reviewed    Imaging findings:  Most recent diamox study:  Impression:   1. No acute intracranial pathology. Right cerebellar tonsillar  herniation. Low lying left cerebellar tonsil.  2. No change in cerebral blood flow (CBF) in pre and post-Diamox  study    Impression:  Significant Ch Ch changes with normal perfusion study and no neurological deficits    At this point, since perfusion study shows no perfusion deficits. Also, since patient is doing well clinically and neurologically intact, there is no need for a planned intervention at this point. Patient quit smoking which was discussed the importance of smoking cessation during our clinic visit too. He takes daily aspirin 81 mg which he was encouraged to continue taking daily.    Plan:  -Follow up with Dr. Bhakta clinic in 6 months, no need for repeating imaging till then  -Counseled patient on importance of smoking cessation and taking aspirin daily    Patient seen and discussed with the attending, Dr. Yony Oliveros MD   Neuroendovascular Surgical Neuroradiology Fellow  Pager: [phone]

## 2024-01-09 NOTE — PATIENT INSTRUCTIONS
Follow up with Dr Bhakta in 6 months, no imaging at that time.    Call Paradise RN  Neurosurgery Care Coordinator with questions/concerns     Thank you for using [facility]

## 2024-01-09 NOTE — LETTER
1/9/2024       RE: Zaheer Mcgee  [address] [place] [address]       Dear Colleague,    Thank you for referring your patient, Zaheer Mcgee, to the [facility] [place] at [facility]. Please see a copy of my visit note below.          [facility] [place]  [address]  3RD FLO[address]  [facility] [address]  Phone: [phone]  Fax: [phone]    Neuroendovascular Clinic Note:    Reason for clinic visit: Follow up visit for Ch Ch Disease after recent perfusion study      History of present illness:    53 year old male with known medical history for moyamoya disease.  Patient had diagnostic cerebral angiogram after suspecting Ch Ch changes as part of hearing evaluation in 11/21/23 that showed significant Ch Ch changes at outside hospital that showed tapering of the proximal supraclinoid segment of the right internal carotid artery with anterograde filling of the ophthalmic artery.  There is hypertrophied anterior cerebral arteries which fill from the left and leptomeningeal collateral filling of the left MCA.  There is occlusion of the M1 trunk and there are hypertrophied lenticulostriate \which has some collateral filling the anterior temporal branch.   His most recent MRI does show some small areas of white matter disease with questionable embolic hit along the left anterior cerebral artery territory.  His angiographic imaging does show that there is a left M1 occlusion given the lack of significant left MCA stroke with suggest that this occlusion was a slow occlusion that occurred over time with the development of collaterals to compensate for the loss.      Patient underwent perfusion CT scan which did not show perfusion deficits. He is clinically doing well. 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    Past Medical History:  Past Medical History:    Diagnosis Date    Chiari I malformation (H)      (central serous retinopathy)     LE    Nonsenile cataract        Past Surgical History:  Past Surgical History:   Procedure Laterality Date    COLONOSCOPY - HIM SCAN N/A 03/01/2008    Colonoscopy lesion remove  3/2008    COLONOSCOPY - HIM SCAN N/A 03/09/2017    Colonoscopy screening 5 year recall due 3/2022    groin surgery  01/01/1999    IR CAROTID CEREBRAL ANGIOGRAM BILATERAL  11/13/2023    STRABISMUS SURGERY Right 01/01/1972       Social History:  Social History     Socioeconomic History    Marital status:      Spouse name: Not on file    Number of children: Not on file    Years of education: Not on file    Highest education level: Not on file   Occupational History    Not on file   Tobacco Use    Smoking status: Former     Packs/day: 2     Types: Cigarettes    Smokeless tobacco: Never   Substance and Sexual Activity    Alcohol use: Not Currently    Drug use: Not on file    Sexual activity: Not on file   Other Topics Concern    Not on file   Social History Narrative    Not on file     Social Determinants of Health     Financial Resource Strain: Low Risk  (9/27/2023)    Financial Resource Strain     Within the past 12 months, have you or your family members you live with been unable to get utilities (heat, electricity) when it was really needed?: No   Food Insecurity: Low Risk  (9/27/2023)    Food Insecurity     Within the past 12 months, did you worry that your food would run out before you got money to buy more?: No     Within the past 12 months, did the food you bought just not last and you didn t have money to get more?: No   Transportation Needs: Low Risk  (9/27/2023)    Transportation Needs     Within the past 12 months, has lack of transportation kept you from medical appointments, getting your medicines, non-medical meetings or appointments, work, or from getting things that you need?: No   Physical Activity: Not on file   Stress: Not on file    Social Connections: Not on file   Interpersonal Safety: Low Risk  (9/27/2023)    Interpersonal Safety     Do you feel physically and emotionally safe where you currently live?: Yes     Within the past 12 months, have you been hit, slapped, kicked or otherwise physically hurt by someone?: No     Within the past 12 months, have you been humiliated or emotionally abused in other ways by your partner or ex-partner?: No   Housing Stability: Low Risk  (9/27/2023)    Housing Stability     Do you have housing? : Yes     Are you worried about losing your housing?: No       Family History:  Family History   Problem Relation Age of Onset    Mental Illness Mother     Hypertension Mother     Cerebrovascular Disease Mother     Glaucoma Mother     Arthritis Mother     Osteoporosis Mother     Arthritis Father     Mental Illness Father     Hypertension Father     Arthritis Sister     Mental Illness Sister     Diabetes Maternal Grandmother     Diabetes Paternal Grandmother        Home Medications:  Current Outpatient Medications   Medication    Acetaminophen 325 MG CAPS    ammonium lactate (AMLACTIN) 12 % external cream    busPIRone (BUSPAR) 5 MG tablet    famotidine (PEPCID) 20 MG tablet    lactase (LACTAID) 3000 UNIT tablet    loratadine (CLARITIN) 10 MG tablet    omeprazole (PRILOSEC OTC) 20 MG EC tablet    traMADol (ULTRAM) 50 MG tablet     No current facility-administered medications for this visit.       Allergies:  No Known Allergies    Physical Examination:  Vitals: There were no vitals taken for this visit.  General: Adult patent sitting comfortably in chair  HEENT: NC/AT, no icterus, op pink and moist  Cardiac: RRR  Chest: non-labored on RA  Abdomen: S/NT/ND  Extremities: Warm, no edema  Skin: No rash or lesion   Psych: Mood pleasant, affect congruent  Neuro:  Mental status: Awake, alert, attentive, oriented to self, time, place, and circumstance. Language is fluent and coherent with intact comprehension of complex  commands, naming and repetition.  Cranial nerves: VFF, PERRL, conjugate gaze, EOMI, facial sensation intact, face symmetric, shoulder shrug strong, tongue/uvula midline, no dysarthria.   Motor: Normal bulk and tone. No abnormal movements. 5/5 strength in 4/4 extremities.   Sensory: Intact to light touch, pin, vibration, and proprioception  Coordination: FNF and HS without ataxia or dysmetria. Rapid alternating movements intact.   Gait: Normal width, stride length, turn, and arm swing. Station normal. Heel, toe, and tandem walk intact.      Laboratory findings:  Reviewed    Imaging findings:  Most recent diamox study:  Impression:   1. No acute intracranial pathology. Right cerebellar tonsillar  herniation. Low lying left cerebellar tonsil.  2. No change in cerebral blood flow (CBF) in pre and post-Diamox  study    Impression:  Significant Ch Ch changes with normal perfusion study and no neurological deficits    At this point, since perfusion study shows no perfusion deficits. Also, since patient is doing well clinically and neurologically intact, there is no need for a planned intervention at this point. Patient quit smoking which was discussed the importance of smoking cessation during our clinic visit too. He takes daily aspirin 81 mg which he was encouraged to continue taking daily.    Plan:  -Follow up with Dr. Bhakta clinic in 6 months, no need for repeating imaging till then  -Counseled patient on importance of smoking cessation and taking aspirin daily    Patient seen and discussed with the attending, Dr. Yony Oliveros MD   Neuroendovascular Surgical Neuroradiology Fellow  Pager: [phone]        Again, thank you for allowing me to participate in the care of your patient.      Sincerely,    Sergio Bhakta MD

## 2024-01-25 ENCOUNTER — ALLIED HEALTH/NURSE VISIT (OUTPATIENT)
Dept: AUDIOLOGY | Facility: OTHER | Age: 54
End: 2024-01-25
Attending: PHYSICIAN ASSISTANT
Payer: COMMERCIAL

## 2024-01-25 DIAGNOSIS — H90.3 SENSORINEURAL HEARING LOSS (SNHL) OF BOTH EARS: Primary | ICD-10-CM

## 2024-01-25 PROCEDURE — V5299 HEARING SERVICE: HCPCS

## 2024-01-25 NOTE — PROGRESS NOTES
HEARING AID CHECK    BACKGROUND:  Zaheer Mcgee, a 53 year old male, was seen today for an hearing aid check.  Mr. Mcgee wears Binaural Oticon Real 2 miniRITE R hearing aids.  Mr. Mcgee reported left aid not working.    FINDINGS:  Visual inspection and cleaning provided.   C-stop showed cerumen impaction and changed with new. 8mm open domes changed with new. Battery was tested and good. Good listening check.    Reviewed cleaning, troubleshooting, and preventative care with patient. Any cleaning tools used were provided as customer courtesy.    Services performed and warranty reviewed with patient.    PLAN:  Based on patient report, no audiology appointment for adjustments needed.Mr. Mcgee will return to clinic in 12 months, sooner if needed. Patient had no further questions and reports satisfaction.         Maida Golden  Audiology Assistant  [facility]  [phone]

## 2024-02-14 DIAGNOSIS — G89.29 OTHER CHRONIC PAIN: ICD-10-CM

## 2024-02-14 RX ORDER — TRAMADOL HYDROCHLORIDE 50 MG/1
TABLET ORAL
Qty: 18 TABLET | Refills: 0 | OUTPATIENT
Start: 2024-02-14

## 2024-02-14 RX ORDER — TRAMADOL HYDROCHLORIDE 50 MG/1
TABLET ORAL
Qty: 60 TABLET | Refills: 0 | Status: SHIPPED | OUTPATIENT
Start: 2024-02-14 | End: 2024-04-09

## 2024-02-14 NOTE — TELEPHONE ENCOUNTER
traMADol (ULTRAM) 50 MG tablet         Last Written Prescription Date:  11/6/23  Last Fill Quantity: 60,   # refills: 0  Last Office Visit: 11/6/23  Future Office visit:       Routing refill request to provider for review/approval because:    Drug not on the [facility], [facility] or [facility] refill protocol or controlled substance

## 2024-03-05 DIAGNOSIS — K21.00 GASTROESOPHAGEAL REFLUX DISEASE WITH ESOPHAGITIS WITHOUT HEMORRHAGE: ICD-10-CM

## 2024-03-05 RX ORDER — OMEPRAZOLE 40 MG/1
CAPSULE, DELAYED RELEASE ORAL
Qty: 90 CAPSULE | Refills: 1 | Status: SHIPPED | OUTPATIENT
Start: 2024-03-05 | End: 2024-09-30

## 2024-03-05 NOTE — TELEPHONE ENCOUNTER
OMEPRAZOLE DR 40 MG CAPSULE         Last Written Prescription Date:  3/29/23  Last Fill Quantity: 180,   # refills: 1  Last Office Visit: 11/6/23  Future Office visit:       Routing refill request to provider for review/approval because:  PPI Protocol [id number]/05/2024 10:46 AM   Protocol Details Medication is active on med list

## 2024-03-28 PROBLEM — F11.90 CHRONIC, CONTINUOUS USE OF OPIOIDS: Status: ACTIVE | Noted: 2024-03-28

## 2024-04-08 DIAGNOSIS — G89.29 OTHER CHRONIC PAIN: ICD-10-CM

## 2024-04-09 RX ORDER — TRAMADOL HYDROCHLORIDE 50 MG/1
TABLET ORAL
Qty: 60 TABLET | Refills: 0 | Status: SHIPPED | OUTPATIENT
Start: 2024-04-09 | End: 2024-06-17

## 2024-04-09 NOTE — TELEPHONE ENCOUNTER
TRAMADOL HCL 50 MG TABLET         Last Written Prescription Date:  2/14/24  Last Fill Quantity: 60,   # refills: 0  Last Office Visit: 11/6/23  Future Office visit:       Routing refill request to provider for review/approval because:    Drug not on the [facility], [facility] or [facility] refill protocol or controlled substance

## 2024-04-15 ENCOUNTER — OFFICE VISIT (OUTPATIENT)
Dept: PODIATRY | Facility: OTHER | Age: 54
End: 2024-04-15
Attending: PODIATRIST
Payer: COMMERCIAL

## 2024-04-15 VITALS
HEART RATE: 82 BPM | OXYGEN SATURATION: 95 % | SYSTOLIC BLOOD PRESSURE: 126 MMHG | DIASTOLIC BLOOD PRESSURE: 73 MMHG | TEMPERATURE: 98.2 F

## 2024-04-15 DIAGNOSIS — L60.3 ONYCHODYSTROPHY: ICD-10-CM

## 2024-04-15 DIAGNOSIS — L84 CALLUS OF FOOT: Primary | ICD-10-CM

## 2024-04-15 PROCEDURE — G0463 HOSPITAL OUTPT CLINIC VISIT: HCPCS

## 2024-04-15 PROCEDURE — 11721 DEBRIDE NAIL 6 OR MORE: CPT | Performed by: PODIATRIST

## 2024-04-15 PROCEDURE — 11056 PARNG/CUTG B9 HYPRKR LES 2-4: CPT | Performed by: PODIATRIST

## 2024-04-15 ASSESSMENT — ANXIETY QUESTIONNAIRES
4. TROUBLE RELAXING: NOT AT ALL
3. WORRYING TOO MUCH ABOUT DIFFERENT THINGS: NOT AT ALL
1. FEELING NERVOUS, ANXIOUS, OR ON EDGE: NOT AT ALL
2. NOT BEING ABLE TO STOP OR CONTROL WORRYING: NOT AT ALL
6. BECOMING EASILY ANNOYED OR IRRITABLE: NOT AT ALL
5. BEING SO RESTLESS THAT IT IS HARD TO SIT STILL: NOT AT ALL
GAD7 TOTAL SCORE: 0
7. FEELING AFRAID AS IF SOMETHING AWFUL MIGHT HAPPEN: NOT AT ALL
GAD7 TOTAL SCORE: 0

## 2024-04-15 ASSESSMENT — PAIN SCALES - GENERAL: PAINLEVEL: MILD PAIN (3)

## 2024-04-15 NOTE — PROGRESS NOTES
Chief complaint: Patient presents with:  Musculoskeletal Problem: callus      History of Present Illness: This 54 year old male is seen for paring of paring of painful caluses.    He has been using Ammonium Lactate cream daily and it helps reduce the pain. He purchased new tennis shoes. He has been working outside so he is wearing older shoes outside. He would also like to discuss other treatment options to keep the callus from coming back. He was dispensed CMOs around November, 2023. He only wore them once and he has not yet adjusted to the orthotics.     He is also having difficulty trimming his toenails because they are too thick to trim. He would like them trimmed today.    No further pedal complaints today.     Historically:  Patient was previously living in [place] and he says another podiatrist, Dr. Iraheta (at [facility]), gave him an anti-fungal for his toenails as well as a cream for his calluses. She previously pared his calluses, advised him to use antiperspirant spray on his feet, Aluminum chloride to stop the sweaty feet, antifungal creams (Terbinafine) in the evenings, and Urea Cream 20% on the calluses.      /73 (BP Location: Left arm, Patient Position: Sitting, Cuff Size: Adult Regular)   Pulse 82   Temp 98.2  F (36.8  C) (Tympanic)   SpO2 95%     Patient Active Problem List   Diagnosis    Central serous retinopathy    Cataract    ADHD    Anxiety    Bilateral hearing loss    GERD (gastroesophageal reflux disease)    HTN (hypertension)    ZOFIA (obstructive sleep apnea)    Moyamoya disease    Chronic, continuous use of opioids       Past Surgical History:   Procedure Laterality Date    COLONOSCOPY - HIM SCAN N/A 03/01/2008    Colonoscopy lesion remove  3/2008    COLONOSCOPY - HIM SCAN N/A 03/09/2017    Colonoscopy screening 5 year recall due 3/2022    groin surgery  01/01/1999    IR CAROTID CEREBRAL ANGIOGRAM BILATERAL  11/13/2023    STRABISMUS SURGERY Right  01/01/1972       Current Outpatient Medications   Medication Sig Dispense Refill    Acetaminophen 325 MG CAPS Take 325-650 mg by mouth every 4 hours as needed      ammonium lactate (AMLACTIN) 12 % external cream Apply topically 2 times daily 385 g 3    busPIRone (BUSPAR) 5 MG tablet Take 5 mg by mouth 3 times daily      famotidine (PEPCID) 20 MG tablet Take 1 tablet (20 mg) by mouth 2 times daily 90 tablet 1    lactase (LACTAID) 3000 UNIT tablet Take 1 tablet (3,000 Units) by mouth 3 times daily (with meals) 30 tablet 1    loratadine (CLARITIN) 10 MG tablet Take 10 mg by mouth daily      omeprazole (PRILOSEC) 40 MG DR capsule TAKE 1 CAPSULE BYMOUTH DAILY. 90 capsule 1    traMADol (ULTRAM) 50 MG tablet TAKE 1 TABLET BY MOUTH TWICE DAILY AS NEEDED FOR SEVERE PAIN(7-10) 60 tablet 0     No current facility-administered medications for this visit.        No Known Allergies    Family History   Problem Relation Age of Onset    Mental Illness Mother     Hypertension Mother     Cerebrovascular Disease Mother     Glaucoma Mother     Arthritis Mother     Osteoporosis Mother     Arthritis Father     Mental Illness Father     Hypertension Father     Arthritis Sister     Mental Illness Sister     Diabetes Maternal Grandmother     Diabetes Paternal Grandmother        Social History     Socioeconomic History    Marital status:      Spouse name: None    Number of children: None    Years of education: None    Highest education level: None   Tobacco Use    Smoking status: Former     Packs/day: 2.00     Types: Cigarettes    Smokeless tobacco: Never     Social Determinants of Health     Financial Resource Strain: Low Risk  (9/27/2023)    Financial Resource Strain     Within the past 12 months, have you or your family members you live with been unable to get utilities (heat, electricity) when it was really needed?: No   Food Insecurity: Low Risk  (9/27/2023)    Food Insecurity     Within the past 12 months, did you worry that your  food would run out before you got money to buy more?: No     Within the past 12 months, did the food you bought just not last and you didn t have money to get more?: No   Transportation Needs: Low Risk  (9/27/2023)    Transportation Needs     Within the past 12 months, has lack of transportation kept you from medical appointments, getting your medicines, non-medical meetings or appointments, work, or from getting things that you need?: No   Interpersonal Safety: Low Risk  (9/27/2023)    Interpersonal Safety     Do you feel physically and emotionally safe where you currently live?: Yes     Within the past 12 months, have you been hit, slapped, kicked or otherwise physically hurt by someone?: No     Within the past 12 months, have you been humiliated or emotionally abused in other ways by your partner or ex-partner?: No   Housing Stability: Low Risk  (9/27/2023)    Housing Stability     Do you have housing? : Yes     Are you worried about losing your housing?: No       ROS: 10 point ROS neg other than the symptoms noted above in the HPI.  EXAM  Constitutional: healthy, alert, and no distress    Psychiatric: mentation appears normal and affect normal/bright    VASCULAR:  -Dorsalis pedis pulse +2/4 b/l  -Posterior tibial pulse +2/4 b/l  -Capillary refill time < 3 seconds to b/l hallux  NEURO:  -Light touch sensation intact to b/l plantar forefoot  DERM:  -Skin temperature, texture and turgor WNL b/l  -Toenails elongated, thickened and dystrophic x 10  -Moderate hyperkeratotic lesion on the RIGHT plantar 1st metatarsal head  -Hyperkeratotic lesion on the medial plantar distal aspect of the proximal phalanx of the bilateral hallux    MSK:  -Pain on palpation to the RIGHT plantar 1st metatarsal head hyperkeratotic lesion   -DORSIFLEXION contracture to MTPJ 2-5 b/l with flexion contracture to PIPJ of digits 2-5 b/l  ---RIGHT third toe is a flexible contracture at the PIPJ and mildly flexible at the DIPJ  ---Mild Pain on  palpation to the RIGHT distal third toe  -Muscle strength of ankles +5/5 for dorsiflexion, plantarflexion, ABDUction and ADDuction b/l    ============================================================    ASSESSMENT:  (L84) Callus of foot  (primary encounter diagnosis)    (L60.3) Onychodystrophy      PLAN:  -Patient evaluated and examined. Treatment options discussed with no educational barriers noted.    -Callus pared x 1 to the RIGHT plantar first metatarsal head without incident  ---Patient reminded that the callus will likely return due to the underlying, prominent bone causing the callus while the patient is walking.    -High risk toenail debridement x 10 toenails without incident on 04/15/2024    -Ammonium Lactate was ordered on 10/02/2023. Patient is using the cream daily.  -CMOs: Patient has CMOs from November / December of 2023. He has worn them only once. He is advised to slowly adjust to them. If he tries to increase time wearing them over a couple of weeks and they are not comfortable, then he is advised to call the orthotist, Mehreen Lombardi to discuss orthotic modifications.    -Patient in agreement with the above treatment plan and all of patient's questions were answered.      Return to clinic 63+ days for toenail debridement and callus paring        Hollie Nina DPM

## 2024-04-16 NOTE — PROGRESS NOTES
"  Assessment & Plan   Problem List Items Addressed This Visit          Other    Chronic, continuous use of opioids - Primary    Relevant Orders    Drug Confirmation Panel Urine with Creat     Other Visit Diagnoses       Common wart   wart was treated with liquid nitrogen today x 3.  Referral also placed as he may need this excised as this will be the third treatment.    Relevant Orders    Orthopedic  Referral               25 minutes spent by me on the date of the encounter doing chart review, review of test results, interpretation of tests, patient visit, and documentation      BMI  Estimated body mass index is 30.38 kg/m  as calculated from the following:    Height as of 1/9/24: 1.702 m (5' 7\").    Weight as of this encounter: 88 kg (194 lb).           No follow-ups on file.      Jacinda Gama is a 54 year old, presenting for the following health issues:  Wart and Referral    History of Present Illness       Reason for visit:  Alergie test callus on left thumb    He eats 0-1 servings of fruits and vegetables daily.He consumes 0 sweetened beverage(s) daily.He exercises with enough effort to increase his heart rate 10 to 19 minutes per day.  He exercises with enough effort to increase his heart rate 3 or less days per week. He is missing 1 dose(s) of medications per week.  He is not taking prescribed medications regularly due to remembering to take.    Patient presents today due to a wart on his left thumb.  This has been there for some time and has been treated twice by myself with liquid nitrogen to no results.  Patient states that it continues to bother him and he would like it removed.  He is willing to have liquid nitrogen applied today to see if that helps at all but despite 2 previous treatments the wart remains.  He is also due for CSA today along with urine drug testing.  CSA was discussed reviewed and signed by both parties.  Patient was given a copy.    He also has complaints of worsening " seasonal allergies.  We did do a RAST sting on him.  He would like to see allergy immunology for further recommendations.      CSA due       WART(S)  Onset:   Description:   Location: LT thumb   Number of warts: 1  Painful: YES  Accompanying Signs & Symptoms:  Signs of infection: no   History:   History of trauma: no   Prior warts: YES- repeat freezing of wart -11/6/23 at Office Visit with Dr. Rivera    Therapies Tried and outcome: freezing of wart in the past          Review of Systems  Constitutional, HEENT, cardiovascular, pulmonary, gi and gu systems are negative, except as otherwise noted.      Objective    /78 (BP Location: Left arm, Patient Position: Sitting, Cuff Size: Adult Large)   Pulse 75   Temp 98.6  F (37  C) (Tympanic)   Resp 20   Wt 88 kg (194 lb)   SpO2 97%   BMI 30.38 kg/m    Body mass index is 30.38 kg/m .  Physical Exam   GENERAL: alert and no distress  SKIN: Wart on distal left thumb.  PSYCH: mentation appears normal, affect normal/bright    Office Visit on 11/06/2023   Component Date Value Ref Range Status    Creatinine 11/06/2023 0.87  0.67 - 1.17 mg/dL Final    GFR Estimate 11/06/2023 >90  >60 mL/min/1.73m2 Final    WBC Count 11/06/2023 11.7 (H)  4.0 - 11.0 10e3/uL Final    RBC Count 11/06/2023 5.65  4.40 - 5.90 10e6/uL Final    Hemoglobin 11/06/2023 16.5  13.3 - 17.7 g/dL Final    Hematocrit 11/06/2023 49.0  40.0 - 53.0 % Final    MCV 11/06/2023 87  78 - 100 fL Final    MCH 11/06/2023 29.2  26.5 - 33.0 pg Final    MCHC 11/06/2023 33.7  31.5 - 36.5 g/dL Final    RDW 11/06/2023 12.3  10.0 - 15.0 % Final    Platelet Count 11/06/2023 292  150 - 450 10e3/uL Final    % Neutrophils 11/06/2023 59  % Final    % Lymphocytes 11/06/2023 32  % Final    % Monocytes 11/06/2023 6  % Final    % Eosinophils 11/06/2023 2  % Final    % Basophils 11/06/2023 1  % Final    % Immature Granulocytes 11/06/2023 0  % Final    Absolute Neutrophils 11/06/2023 6.9  1.6 - 8.3 10e3/uL Final    Absolute  Lymphocytes 11/06/2023 3.7  0.8 - 5.3 10e3/uL Final    Absolute Monocytes 11/06/2023 0.7  0.0 - 1.3 10e3/uL Final    Absolute Eosinophils 11/06/2023 0.3  0.0 - 0.7 10e3/uL Final    Absolute Basophils 11/06/2023 0.1  0.0 - 0.2 10e3/uL Final    Absolute Immature Granulocytes 11/06/2023 0.0  <=0.4 10e3/uL Final     No results found for any visits on 04/18/24.  No results found for this or any previous visit (from the past 24 hour(s)).        Signed Electronically by: Avila Rivera DO

## 2024-04-18 ENCOUNTER — OFFICE VISIT (OUTPATIENT)
Dept: INTERNAL MEDICINE | Facility: OTHER | Age: 54
End: 2024-04-18
Attending: INTERNAL MEDICINE
Payer: COMMERCIAL

## 2024-04-18 VITALS
OXYGEN SATURATION: 97 % | TEMPERATURE: 98.6 F | RESPIRATION RATE: 20 BRPM | BODY MASS INDEX: 30.38 KG/M2 | HEART RATE: 75 BPM | SYSTOLIC BLOOD PRESSURE: 120 MMHG | WEIGHT: 194 LBS | DIASTOLIC BLOOD PRESSURE: 78 MMHG

## 2024-04-18 DIAGNOSIS — B07.8 COMMON WART: ICD-10-CM

## 2024-04-18 DIAGNOSIS — J30.2 SEASONAL ALLERGIC RHINITIS, UNSPECIFIED TRIGGER: ICD-10-CM

## 2024-04-18 DIAGNOSIS — F11.90 CHRONIC, CONTINUOUS USE OF OPIOIDS: Primary | ICD-10-CM

## 2024-04-18 LAB
CANNABINOIDS UR QL SCN: NORMAL
CREAT UR-MCNC: 38 MG/DL

## 2024-04-18 PROCEDURE — 99214 OFFICE O/P EST MOD 30 MIN: CPT | Performed by: INTERNAL MEDICINE

## 2024-04-18 PROCEDURE — 80359 METHYLENEDIOXYAMPHETAMINES: CPT | Mod: ZL | Performed by: INTERNAL MEDICINE

## 2024-04-18 PROCEDURE — G0463 HOSPITAL OUTPT CLINIC VISIT: HCPCS

## 2024-04-18 PROCEDURE — 80307 DRUG TEST PRSMV CHEM ANLYZR: CPT | Mod: ZL | Performed by: INTERNAL MEDICINE

## 2024-04-18 PROCEDURE — 80360 METHYLPHENIDATE: CPT | Mod: ZL | Performed by: INTERNAL MEDICINE

## 2024-04-18 ASSESSMENT — PATIENT HEALTH QUESTIONNAIRE - PHQ9
10. IF YOU CHECKED OFF ANY PROBLEMS, HOW DIFFICULT HAVE THESE PROBLEMS MADE IT FOR YOU TO DO YOUR WORK, TAKE CARE OF THINGS AT HOME, OR GET ALONG WITH OTHER PEOPLE: SOMEWHAT DIFFICULT
SUM OF ALL RESPONSES TO PHQ QUESTIONS 1-9: 7
SUM OF ALL RESPONSES TO PHQ QUESTIONS 1-9: 7

## 2024-04-18 ASSESSMENT — ANXIETY QUESTIONNAIRES
GAD7 TOTAL SCORE: 10
1. FEELING NERVOUS, ANXIOUS, OR ON EDGE: SEVERAL DAYS
2. NOT BEING ABLE TO STOP OR CONTROL WORRYING: SEVERAL DAYS
5. BEING SO RESTLESS THAT IT IS HARD TO SIT STILL: NEARLY EVERY DAY
4. TROUBLE RELAXING: NEARLY EVERY DAY
7. FEELING AFRAID AS IF SOMETHING AWFUL MIGHT HAPPEN: SEVERAL DAYS
IF YOU CHECKED OFF ANY PROBLEMS ON THIS QUESTIONNAIRE, HOW DIFFICULT HAVE THESE PROBLEMS MADE IT FOR YOU TO DO YOUR WORK, TAKE CARE OF THINGS AT HOME, OR GET ALONG WITH OTHER PEOPLE: SOMEWHAT DIFFICULT
3. WORRYING TOO MUCH ABOUT DIFFERENT THINGS: SEVERAL DAYS
GAD7 TOTAL SCORE: 10
8. IF YOU CHECKED OFF ANY PROBLEMS, HOW DIFFICULT HAVE THESE MADE IT FOR YOU TO DO YOUR WORK, TAKE CARE OF THINGS AT HOME, OR GET ALONG WITH OTHER PEOPLE?: SOMEWHAT DIFFICULT
7. FEELING AFRAID AS IF SOMETHING AWFUL MIGHT HAPPEN: SEVERAL DAYS
GAD7 TOTAL SCORE: 10
6. BECOMING EASILY ANNOYED OR IRRITABLE: NOT AT ALL

## 2024-04-18 ASSESSMENT — PAIN SCALES - GENERAL: PAINLEVEL: NO PAIN (1)

## 2024-04-18 NOTE — LETTER

## 2024-04-22 LAB
N-NORTRAMADOL/CREAT UR CFM: 1558 NG/MG {CREAT}
O-NORTRAMADOL UR CFM-MCNC: 592 NG/ML
TRAMADOL CTO UR CFM-MCNC: 277 NG/ML
TRAMADOL/CREAT UR: 729 NG/MG {CREAT}

## 2024-04-23 DIAGNOSIS — H40.003 GLAUCOMA SUSPECT OF BOTH EYES: ICD-10-CM

## 2024-04-23 DIAGNOSIS — H35.713 CENTRAL SEROUS CHORIORETINOPATHY OF BOTH EYES: Primary | ICD-10-CM

## 2024-05-06 ENCOUNTER — OFFICE VISIT (OUTPATIENT)
Dept: OPHTHALMOLOGY | Facility: CLINIC | Age: 54
End: 2024-05-06
Attending: OPHTHALMOLOGY
Payer: COMMERCIAL

## 2024-05-06 DIAGNOSIS — H35.713 CENTRAL SEROUS CHORIORETINOPATHY OF BOTH EYES: ICD-10-CM

## 2024-05-06 DIAGNOSIS — H40.003 GLAUCOMA SUSPECT OF BOTH EYES: ICD-10-CM

## 2024-05-06 PROCEDURE — 99214 OFFICE O/P EST MOD 30 MIN: CPT | Performed by: OPHTHALMOLOGY

## 2024-05-06 PROCEDURE — 92134 CPTRZ OPH DX IMG PST SGM RTA: CPT | Performed by: OPHTHALMOLOGY

## 2024-05-06 PROCEDURE — 92250 FUNDUS PHOTOGRAPHY W/I&R: CPT | Performed by: OPHTHALMOLOGY

## 2024-05-06 PROCEDURE — G0463 HOSPITAL OUTPT CLINIC VISIT: HCPCS | Performed by: OPHTHALMOLOGY

## 2024-05-06 PROCEDURE — 99207 FUNDUS AUTOFLUORESCENCE IMAGE (FAF) OU (BOTH EYES): CPT | Mod: 26 | Performed by: OPHTHALMOLOGY

## 2024-05-06 PROCEDURE — 92133 CPTRZD OPH DX IMG PST SGM ON: CPT | Performed by: OPHTHALMOLOGY

## 2024-05-06 PROCEDURE — 99207 OCT OPTIC NERVE RNFL SPECTRALIS OU (BOTH EYES): CPT | Mod: 26 | Performed by: OPHTHALMOLOGY

## 2024-05-06 ASSESSMENT — REFRACTION_WEARINGRX
OS_SPHERE: +0.50
OD_SPHERE: +4.25
OD_ADD: +2.25
OD_AXIS: 025
OD_AXIS: 112
OS_ADD: +2.25
OD_CYLINDER: +4.25
OD_CYLINDER: -3.75
OS_CYLINDER: -1.25
SPECS_TYPE: PAL
OS_SPHERE: -0.75
OS_AXIS: 007
OS_ADD: +2.25
OS_AXIS: 105
OS_CYLINDER: +1.50
OD_SPHERE: -0.25
OD_ADD: +2.25

## 2024-05-06 ASSESSMENT — EXTERNAL EXAM - RIGHT EYE: OD_EXAM: NORMAL

## 2024-05-06 ASSESSMENT — VISUAL ACUITY
OD_CC+: -1
METHOD: SNELLEN - LINEAR
CORRECTION_TYPE: GLASSES
OS_CC: 20/25
OD_CC: 20/20
OS_CC+: -1

## 2024-05-06 ASSESSMENT — CONF VISUAL FIELD
METHOD: COUNTING FINGERS
OD_NORMAL: 1
OD_SUPERIOR_TEMPORAL_RESTRICTION: 0
OD_SUPERIOR_NASAL_RESTRICTION: 0
OD_INFERIOR_TEMPORAL_RESTRICTION: 0
OS_SUPERIOR_TEMPORAL_RESTRICTION: 0
OS_INFERIOR_TEMPORAL_RESTRICTION: 0
OS_INFERIOR_NASAL_RESTRICTION: 0
OS_SUPERIOR_NASAL_RESTRICTION: 0
OS_NORMAL: 1
OD_INFERIOR_NASAL_RESTRICTION: 0

## 2024-05-06 ASSESSMENT — CUP TO DISC RATIO
OD_RATIO: 0.65
OS_RATIO: 0.65

## 2024-05-06 ASSESSMENT — TONOMETRY
OD_IOP_MMHG: 8
OS_IOP_MMHG: 9
IOP_METHOD: TONOPEN

## 2024-05-06 ASSESSMENT — EXTERNAL EXAM - LEFT EYE: OS_EXAM: NORMAL

## 2024-05-06 NOTE — PROGRESS NOTES
CC: follow up of  and glaucoma follow up     Interval History May 6, 2024: No acute changes in vision, no flashes. New floater in the left eye for the past month  HPI: Zaheer Mcgee is a 54 year old gentleman who has a history of vision loss in the left eye since 2006.  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  There were no cystic changes.    LEFT EYE:  good foveal contour. Stable appearance from last OCT--disruption of the IS/OS junction and retinal pigment epithelium. Thick choroid. Small shallow pigment epithelial detachment - stable    OVF 24-2 06/09/23   OD: MD 0.8 reliable, normal    OS: MD 0.5 reliable; paracentral spot of decreased sensitivity    OCT RNFL 05/06/24   OD: normal thickness throughout  OS: temporal borderline thinning  Could be secondary to history of Central serous retinopathy?    AF 05/06/24   Right eye: no abnormalities  Left eye: macula Retinal pigment epithelium atrophy (hypoautofluorescence) with surrounding hyperfluorescence.     Ultrasound 10/16/23  Hyperechoic areas (calcification) in posterior pole appear grossly stable from prior U/S (09/18). Vitreous debris c/w PVD both eyes.  Left eye there is an area of focal chorioretinal elevation noted with marker that is approximately mid-periphery superiorly (nystagmus makes marking precise location difficult).   No patent tears, holes or breaks in area noted.   Retina otherwise appears attached, negative for other CR elevations.     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  Differential diagnosis: other causes of maculopathy. No progression  No obvious choroidal lesion, diffuse Retinal pigment epithelium changes; thick choroid  No history of plaquenil intake  Used Flonase nasal spray rarely - hasn't used in years  Patient with history of sleep apnea- diagnosed 2 ys ago. Uses CPAP  Thick choroid left eye > right eye     Stable distortion   Stable exam and Optical Coherence Tomography. Pigment epithelial detachment without subretinal fluid.   observe   Continue using Amsler grid    Stop caffeine intake - drinks coffee daily 1 travel mug (24oz); stop smoking   Stable     # Cataract, both eyes  Glare Testing   Medium High   Right 20/20 20/25   Left 20/30 20/40-2   becoming visually significant   monitor       # glaucoma suspect OU  - Based in increased c/d ratio   - Tmax 22/21  - FH: Mother with glaucoma (possibly)  - History of taking steroids   - Pachy: 513/544  - Last HVF 03/09/23: new superior arcuate right eye- improved after removing mask and repeated testing; OS normal (first time visual field - reliable OU)  - Last OCT RNFL 03/09/23: OD normal, OS temporal borderline thinning  - Hx of medication intolerance: none  - Hx of kidney stones or asthma: no  - Personal/family hx sickle cell: no  - Hx eye trauma: none  - Gonio: Open to  OU  -RNFL without thinning right eye; repeated visual field right eye showed improvement.  OS VF normal and RNFL thinning likely related to  related atrophy rather than glaucomatous change so will not start drops.    Recommend alternating glaucoma testing  None done 10/16/23     # choroidal calcifications: detected on ultrasound     # History of Chiari I malformation based on MRI findings  # history of moyamoya  Imaging from 11/2023  Findings suggestive of moyamoya disease. Specifically, on the right, and there is a patent but somewhat small caliber and collapsed internal carotid. Filling persists to the level of the ophthalmic artery origin, where there is then antegrade filling of the ophthalmic artery itself but no antegrade filling beyond this. There is a large right posterior communicating artery, which does supply a somewhat tapered appearing distal supraclinoid internal carotid, with fairly brisk filling of a normal-appearing right M1 segment and middle cerebral artery branches.   On  the left, there is complete occlusion of the M1 segment, with some hypertrophy lenticulostriates providing collateral filling of a small anterior temporal branch. The anterior cerebral arteries are also widely patent and large in caliber, both filling from the left. These provide extensive leptomeningeal collateral filling of the left middle cerebral artery, which fills retrogradely.   Normal posterior circulation, with no evidence of branch occlusion or stenosis in the posterior circulation. Intracranially, no significant plaque or stenosis of either carotid bifurcation. No evidence of carotid or vertebral dissection. Normal external carotid circulation bilaterally with standard anatomy.     PLAN:  Dilated 05/06/24   Follow up with retina 6 months with Optical Coherence Tomography and OVF 24-2 no dilation  Retina detachment precautions were discussed with the patient (presence or increased in flashes, floaters or a curtain in the visual field) and was asked to return if any of the those occur     ~~~~~~~~~~~~~~~~~~~~~~~~~~~~~~~~~~   Complete documentation of historical and exam elements from today's encounter can be found in the full encounter summary report (not reduplicated in this progress note).  I personally obtained the chief complaint(s) and history of present illness.  I confirmed and edited as necessary the review of systems, past medical/surgical history, family history, social history, and examination findings as documented by others; and I examined the patient myself.  I personally reviewed the relevant tests, images, and reports as documented above.  I formulated and edited as necessary the assessment and plan and discussed the findings and management plan with the patient and family    Diana Patrick MD   of Ophthalmology.  Retina Service   Department of Ophthalmology and Visual Neurosciences   [facility]  Phone: [phone]   Fax: [phone]

## 2024-05-14 ENCOUNTER — DOCUMENTATION ONLY (OUTPATIENT)
Dept: PODIATRY | Facility: OTHER | Age: 54
End: 2024-05-14

## 2024-05-14 DIAGNOSIS — M20.42 ACQUIRED HAMMER TOE DEFORMITY OF LESSER TOE OF BOTH FEET: Primary | ICD-10-CM

## 2024-05-14 DIAGNOSIS — M20.41 ACQUIRED HAMMER TOE DEFORMITY OF LESSER TOE OF BOTH FEET: Primary | ICD-10-CM

## 2024-05-14 DIAGNOSIS — L84 CALLUS OF FOOT: ICD-10-CM

## 2024-06-09 ENCOUNTER — HEALTH MAINTENANCE LETTER (OUTPATIENT)
Age: 54
End: 2024-06-09

## 2024-06-14 DIAGNOSIS — G89.29 OTHER CHRONIC PAIN: ICD-10-CM

## 2024-06-14 NOTE — TELEPHONE ENCOUNTER
Tramadol (Ultram) 50 mg tablet  Take 1 tablet by mouth twice daily as needed for severe pain (7-10)    Last Written Prescription Date:  4-9-2024  Last Fill Quantity: 60 tablet,   # refills: 0  Last Office Visit: 4-  Future Office visit:    Next 5 appointments (look out 90 days)      Jun 17, 2024  4:00 PM  (Arrive by 3:45 PM)  Return Visit with Hollie Nina DPM  [facility] ([facility] ) [address] [place] [id number] [phone]

## 2024-06-17 RX ORDER — TRAMADOL HYDROCHLORIDE 50 MG/1
TABLET ORAL
Qty: 46 TABLET | Refills: 0 | Status: SHIPPED | OUTPATIENT
Start: 2024-06-17 | End: 2024-07-15

## 2024-07-05 ENCOUNTER — HOSPITAL ENCOUNTER (EMERGENCY)
Facility: HOSPITAL | Age: 54
Discharge: HOME | End: 2024-07-05
Attending: PHYSICIAN ASSISTANT | Admitting: PHYSICIAN ASSISTANT
Payer: COMMERCIAL

## 2024-07-05 VITALS
SYSTOLIC BLOOD PRESSURE: 126 MMHG | TEMPERATURE: 98.4 F | DIASTOLIC BLOOD PRESSURE: 75 MMHG | OXYGEN SATURATION: 97 % | RESPIRATION RATE: 18 BRPM | HEART RATE: 91 BPM

## 2024-07-05 DIAGNOSIS — L03.115 CELLULITIS OF RIGHT LOWER EXTREMITY: ICD-10-CM

## 2024-07-05 PROCEDURE — G0463 HOSPITAL OUTPT CLINIC VISIT: HCPCS

## 2024-07-05 PROCEDURE — 99213 OFFICE O/P EST LOW 20 MIN: CPT | Performed by: PHYSICIAN ASSISTANT

## 2024-07-05 RX ORDER — SULFAMETHOXAZOLE/TRIMETHOPRIM 800-160 MG
1 TABLET ORAL 2 TIMES DAILY
Qty: 14 TABLET | Refills: 0 | Status: SHIPPED | OUTPATIENT
Start: 2024-07-05 | End: 2024-07-12

## 2024-07-05 ASSESSMENT — ENCOUNTER SYMPTOMS
COLOR CHANGE: 1
WOUND: 1
FEVER: 0
MYALGIAS: 1

## 2024-07-05 ASSESSMENT — ACTIVITIES OF DAILY LIVING (ADL): ADLS_ACUITY_SCORE: 35

## 2024-07-05 NOTE — DISCHARGE INSTRUCTIONS
Monitor for worsening redness.   Bactrim to cover concern for skin infection. May want to take before the prilosec/zantac.  Back here with worsening/fevers
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
## 2024-07-05 NOTE — ED NOTES
SHIV Gonzalez CNP assessed patient in triage and determined patient Urgent Care appropriate. Will be seen in Urgent Care.
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
## 2024-07-08 ENCOUNTER — TELEPHONE (OUTPATIENT)
Dept: NEUROSURGERY | Facility: CLINIC | Age: 54
End: 2024-07-08
Payer: COMMERCIAL

## 2024-07-08 NOTE — TELEPHONE ENCOUNTER
Patient called leaving a voice mail message asking to cancel appointment on 7/9 @ 0920 and reschedule.  Note sent to scheduling with request and to call patient to reschedule next available.

## 2024-07-15 DIAGNOSIS — G89.29 OTHER CHRONIC PAIN: ICD-10-CM

## 2024-07-15 RX ORDER — TRAMADOL HYDROCHLORIDE 50 MG/1
50 TABLET ORAL 2 TIMES DAILY PRN
Qty: 46 TABLET | Refills: 0 | Status: SHIPPED | OUTPATIENT
Start: 2024-07-15 | End: 2024-08-21

## 2024-07-15 NOTE — TELEPHONE ENCOUNTER
TRAMADOL HCL 50 MG TABLET         Last Written Prescription Date:  6/17/24  Last Fill Quantity: 46,   # refills: 0  Last Office Visit: 4/18/24  Future Office visit:       Routing refill request to provider for review/approval because:  Drug not on the [facility], [facility] or [facility] refill protocol or controlled substance

## 2024-08-20 DIAGNOSIS — G89.29 OTHER CHRONIC PAIN: ICD-10-CM

## 2024-08-20 NOTE — TELEPHONE ENCOUNTER
Tramadol      Last Written Prescription Date:  7/15/24  Last Fill Quantity: 46,   # refills: 0  Last Office Visit: 4/18/24  Future Office visit:    Next 5 appointments (look out 90 days)      Nov 13, 2024 8:15 AM  (Arrive by 8:00 AM)  Nurse Only with Alanna oCchran  [facility] [place] ([facility] [place] ) [address]  [place] [address]  [phone]             Routing refill request to provider for review/approval because:

## 2024-08-20 NOTE — TELEPHONE ENCOUNTER
Routing refill request to provider for review/approval because:  Drug not on the [facility], [facility] or [facility] refill protocol or controlled substance

## 2024-08-21 RX ORDER — TRAMADOL HYDROCHLORIDE 50 MG/1
50 TABLET ORAL 2 TIMES DAILY PRN
Qty: 46 TABLET | Refills: 0 | Status: SHIPPED | OUTPATIENT
Start: 2024-08-21 | End: 2024-09-19

## 2024-09-19 DIAGNOSIS — G89.29 OTHER CHRONIC PAIN: ICD-10-CM

## 2024-09-19 RX ORDER — TRAMADOL HYDROCHLORIDE 50 MG/1
TABLET ORAL
Qty: 46 TABLET | Refills: 0 | OUTPATIENT
Start: 2024-09-19

## 2024-09-19 RX ORDER — TRAMADOL HYDROCHLORIDE 50 MG/1
50 TABLET ORAL 2 TIMES DAILY PRN
Qty: 46 TABLET | Refills: 0 | Status: SHIPPED | OUTPATIENT
Start: 2024-09-19

## 2024-09-19 NOTE — TELEPHONE ENCOUNTER
Tramadol      Last Written Prescription Date:  8/21/24  Last Fill Quantity: 46,   # refills: 0  Last Office Visit: 4/18/24  Future Office visit:    Next 5 appointments (look out 90 days)      Nov 13, 2024 8:15 AM  (Arrive by 8:00 AM)  Nurse Only with Alanna Cochran  [facility] [place] ([facility] [place] ) [address]  [place] [address]  [phone]             Routing refill request to provider for review/approval because:

## 2024-09-19 NOTE — TELEPHONE ENCOUNTER
Routing refill request to provider for review/approval because:  Drug not on the [facility], [facility] or [facility] refill protocol or controlled substance

## 2024-09-24 ENCOUNTER — TELEPHONE (OUTPATIENT)
Dept: OPHTHALMOLOGY | Facility: CLINIC | Age: 54
End: 2024-09-24
Payer: COMMERCIAL

## 2024-09-25 DIAGNOSIS — G89.29 OTHER CHRONIC PAIN: ICD-10-CM

## 2024-09-25 RX ORDER — TRAMADOL HYDROCHLORIDE 50 MG/1
TABLET ORAL
Qty: 46 TABLET | Refills: 0 | OUTPATIENT
Start: 2024-09-25

## 2024-09-25 RX ORDER — TRAMADOL HYDROCHLORIDE 50 MG/1
50 TABLET ORAL 2 TIMES DAILY PRN
Qty: 46 TABLET | Refills: 0 | OUTPATIENT
Start: 2024-09-25

## 2024-09-25 NOTE — TELEPHONE ENCOUNTER
TRAMADOL HCL 50 MG TABLET         Last Written Prescription Date:  9/19/24  Last Fill Quantity: 46,   # refills: 0  Last Office Visit: 4/18/24  Future Office visit:    Next 5 appointments (look out 90 days)      Nov 13, 2024 8:15 AM  (Arrive by 8:00 AM)  Nurse Only with Alanna Cochran  [facility] [place] ([facility] [place] ) [address]  [place] [address]  [phone]             Routing refill request to provider for review/approval because:  Drug not on the [facility], [facility] or [facility] refill protocol or controlled substance

## 2024-09-27 ENCOUNTER — TELEPHONE (OUTPATIENT)
Dept: OPHTHALMOLOGY | Facility: CLINIC | Age: 54
End: 2024-09-27
Payer: COMMERCIAL

## 2024-09-30 DIAGNOSIS — K21.00 GASTROESOPHAGEAL REFLUX DISEASE WITH ESOPHAGITIS WITHOUT HEMORRHAGE: ICD-10-CM

## 2024-09-30 RX ORDER — OMEPRAZOLE 40 MG/1
40 CAPSULE, DELAYED RELEASE ORAL DAILY
Qty: 90 CAPSULE | Refills: 1 | Status: SHIPPED | OUTPATIENT
Start: 2024-09-30 | End: 2024-09-30

## 2024-09-30 RX ORDER — OMEPRAZOLE 40 MG/1
40 CAPSULE, DELAYED RELEASE ORAL DAILY
Qty: 90 CAPSULE | Refills: 1 | Status: SHIPPED | OUTPATIENT
Start: 2024-09-30

## 2024-10-23 DIAGNOSIS — K21.00 GASTROESOPHAGEAL REFLUX DISEASE WITH ESOPHAGITIS WITHOUT HEMORRHAGE: ICD-10-CM

## 2024-10-23 NOTE — TELEPHONE ENCOUNTER
Pepcid      Last Written Prescription Date:  3/29/23  Last Fill Quantity: 90,   # refills: 1  Last Office Visit: 4/18/24  Future Office visit:    Next 5 appointments (look out 90 days)      Nov 13, 2024 8:15 AM  (Arrive by 8:00 AM)  Nurse Only with Alanna Cochran  [facility] [place] ([facility] [place] ) [address]  [place] [address]  [phone]             Routing refill request to provider for review/approval because:

## 2024-10-24 RX ORDER — FAMOTIDINE 20 MG/1
20 TABLET, FILM COATED ORAL 2 TIMES DAILY
Qty: 90 TABLET | Refills: 1 | Status: SHIPPED | OUTPATIENT
Start: 2024-10-24

## 2024-10-28 DIAGNOSIS — G89.29 OTHER CHRONIC PAIN: ICD-10-CM

## 2024-10-29 RX ORDER — TRAMADOL HYDROCHLORIDE 50 MG/1
50 TABLET ORAL 2 TIMES DAILY PRN
Qty: 46 TABLET | Refills: 0 | Status: SHIPPED | OUTPATIENT
Start: 2024-10-29

## 2024-10-29 NOTE — TELEPHONE ENCOUNTER
TRAMADOL HCL 50 MG TABLET         Last Written Prescription Date:  9/19/24  Last Fill Quantity: 46,   # refills: 0  Last Office Visit: 4/18/24  Future Office visit:    Next 5 appointments (look out 90 days)      Nov 13, 2024 8:15 AM  (Arrive by 8:00 AM)  Nurse Only with Alanna Cochran  [facility] [place] ([facility] [place] ) [address]  [place] [address]  [phone]             Routing refill request to provider for review/approval because:  Drug not on the [facility], [facility] or [facility] refill protocol or controlled substance

## 2024-11-06 DIAGNOSIS — H91.93 DECREASED HEARING OF BOTH EARS: Primary | ICD-10-CM

## 2024-11-12 ENCOUNTER — OFFICE VISIT (OUTPATIENT)
Dept: PODIATRY | Facility: OTHER | Age: 54
End: 2024-11-12
Attending: PODIATRIST
Payer: COMMERCIAL

## 2024-11-12 VITALS
HEART RATE: 89 BPM | OXYGEN SATURATION: 96 % | TEMPERATURE: 98.4 F | DIASTOLIC BLOOD PRESSURE: 72 MMHG | SYSTOLIC BLOOD PRESSURE: 125 MMHG

## 2024-11-12 DIAGNOSIS — Z13.89 SCREENING FOR DIABETIC PERIPHERAL NEUROPATHY: ICD-10-CM

## 2024-11-12 DIAGNOSIS — M20.21 HALLUX RIGIDUS OF RIGHT FOOT: ICD-10-CM

## 2024-11-12 DIAGNOSIS — L84 CALLUS OF FOOT: Primary | ICD-10-CM

## 2024-11-12 DIAGNOSIS — L60.3 ONYCHODYSTROPHY: ICD-10-CM

## 2024-11-12 PROCEDURE — 11056 PARNG/CUTG B9 HYPRKR LES 2-4: CPT | Performed by: PODIATRIST

## 2024-11-12 PROCEDURE — 11721 DEBRIDE NAIL 6 OR MORE: CPT | Mod: XS | Performed by: PODIATRIST

## 2024-11-12 PROCEDURE — 99207 PR FOOT EXAM NO CHARGE: CPT | Performed by: PODIATRIST

## 2024-11-12 PROCEDURE — 99213 OFFICE O/P EST LOW 20 MIN: CPT | Mod: 25 | Performed by: PODIATRIST

## 2024-11-12 ASSESSMENT — PAIN SCALES - GENERAL: PAINLEVEL_OUTOF10: MILD PAIN (3)

## 2024-11-12 NOTE — PROGRESS NOTES
Chief complaint: Patient presents with:  Musculoskeletal Problem: Callus      History of Present Illness: This 54 year old male is seen for paring of paring of painful caluses.    He is here today for toenail debridement and callus paring.     He has been using Ammonium Lactate cream daily and it helps reduce the pain. It still works for him but the callus keeps coming back.    He was dispensed CMOs around November, 2023. He only wore them once and he has not yet adjusted to the orthotics.     Patient says he sometimes has pain on the bottom of the big toe joint when he is walking. The pain is from pressure and not from moving the big toe joint. He is wondering if there is a way to resolve this pain and how the callus can be permanently removed from his foot so it does not come back.    No further pedal complaints today.     Historically:  Patient was previously living in [place] and he says another podiatrist, Dr. Iraheta (at [facility] through [facility]), gave him an anti-fungal for his toenails as well as a cream for his calluses. She previously pared his calluses, advised him to use antiperspirant spray on his feet, Aluminum chloride to stop the sweaty feet, antifungal creams (Terbinafine) in the evenings, and Urea Cream 20% on the calluses.      /72 (BP Location: Left arm, Patient Position: Sitting, Cuff Size: Adult Regular)   Pulse 89   Temp 98.4  F (36.9  C) (Tympanic)   SpO2 96%     Patient Active Problem List   Diagnosis    Central serous retinopathy    Cataract    ADHD    Anxiety    Bilateral hearing loss    GERD (gastroesophageal reflux disease)    HTN (hypertension)    ZOFIA (obstructive sleep apnea)    Moyamoya disease    Chronic, continuous use of opioids       Past Surgical History:   Procedure Laterality Date    COLONOSCOPY - HIM SCAN N/A 03/01/2008    Colonoscopy lesion remove  3/2008    COLONOSCOPY - HIM SCAN N/A 03/09/2017    Colonoscopy screening 5 year recall due 3/2022     groin surgery  01/01/1999    IR CAROTID CEREBRAL ANGIOGRAM BILATERAL  11/13/2023    IR LUMBAR PUNCTURE  11/13/2023    STRABISMUS SURGERY Right 01/01/1972       Current Outpatient Medications   Medication Sig Dispense Refill    Acetaminophen 325 MG CAPS Take 325-650 mg by mouth every 4 hours as needed      ammonium lactate (AMLACTIN) 12 % external cream Apply topically 2 times daily 385 g 3    busPIRone (BUSPAR) 5 MG tablet Take 5 mg by mouth 3 times daily      famotidine (PEPCID) 20 MG tablet TAKE (1) TABLET BY MOUTH TWICE A DAY. 90 tablet 1    lactase (LACTAID) 3000 UNIT tablet Take 1 tablet (3,000 Units) by mouth 3 times daily (with meals) 30 tablet 1    loratadine (CLARITIN) 10 MG tablet Take 10 mg by mouth daily      omeprazole (PRILOSEC) 40 MG DR capsule Take 1 capsule (40 mg) by mouth daily. 90 capsule 1    traMADol (ULTRAM) 50 MG tablet TAKE 1 TABLET BY MOUTH TWICE DAILY AS NEEDED FOR SEVERE PAIN 46 tablet 0     No current facility-administered medications for this visit.        No Known Allergies    Family History   Problem Relation Age of Onset    Mental Illness Mother     Hypertension Mother     Cerebrovascular Disease Mother     Glaucoma Mother     Arthritis Mother     Osteoporosis Mother     Arthritis Father     Mental Illness Father     Hypertension Father     Arthritis Sister     Mental Illness Sister     Diabetes Maternal Grandmother     Diabetes Paternal Grandmother        Social History     Socioeconomic History    Marital status:      Spouse name: None    Number of children: None    Years of education: None    Highest education level: None   Tobacco Use    Smoking status: Former     Packs/day: 2.00     Types: Cigarettes    Smokeless tobacco: Never     Social Determinants of Health     Financial Resource Strain: Low Risk  (9/27/2023)    Financial Resource Strain     Within the past 12 months, have you or your family members you live with been unable to get utilities (heat, electricity) when  it was really needed?: No   Food Insecurity: Low Risk  (9/27/2023)    Food Insecurity     Within the past 12 months, did you worry that your food would run out before you got money to buy more?: No     Within the past 12 months, did the food you bought just not last and you didn t have money to get more?: No   Transportation Needs: Low Risk  (9/27/2023)    Transportation Needs     Within the past 12 months, has lack of transportation kept you from medical appointments, getting your medicines, non-medical meetings or appointments, work, or from getting things that you need?: No   Interpersonal Safety: Low Risk  (9/27/2023)    Interpersonal Safety     Do you feel physically and emotionally safe where you currently live?: Yes     Within the past 12 months, have you been hit, slapped, kicked or otherwise physically hurt by someone?: No     Within the past 12 months, have you been humiliated or emotionally abused in other ways by your partner or ex-partner?: No   Housing Stability: Low Risk  (9/27/2023)    Housing Stability     Do you have housing? : Yes     Are you worried about losing your housing?: No       ROS: 10 point ROS neg other than the symptoms noted above in the HPI.  EXAM  Constitutional: healthy, alert, and no distress    Psychiatric: mentation appears normal and affect normal/bright    VASCULAR:  -Dorsalis pedis pulse +2/4 b/l  -Posterior tibial pulse +2/4 b/l  -Capillary refill time < 3 seconds to b/l hallux  NEURO:  -Light touch sensation intact to b/l plantar forefoot  DERM:  -Skin temperature, texture and turgor WNL b/l  -Toenails elongated, thickened and dystrophic x 10  -Moderate hyperkeratotic lesion on the RIGHT plantar 1st metatarsal head  -Hyperkeratotic lesion on the medial plantar distal aspect of the proximal phalanx of the bilateral hallux    MSK:  -Pain on palpation to the RIGHT plantar 1st metatarsal head hyperkeratotic lesion   -DORSIFLEXION contracture to MTPJ 2-5 b/l with flexion  contracture to PIPJ of digits 2-5 b/l  ---RIGHT third toe is a flexible contracture at the PIPJ and mildly flexible at the DIPJ  -RIGHT 1st MTPJ ROM within normal limits without forefoot loading but limited to less than 15 degrees with forefoot loading  -Muscle strength of ankles +5/5 for dorsiflexion, plantarflexion, ABDUction and ADDuction b/l    ============================================================    ASSESSMENT:    (L84) Callus of foot  (primary encounter diagnosis)    (L60.3) Onychodystrophy    (M20.21) Hallux rigidus of right foot      PLAN:  -Patient evaluated and examined. Treatment options discussed with no educational barriers noted.    -Callus pared x 3 to the RIGHT plantar first metatarsal head without incident and the medial plantar distal aspect of the proximal phalanx of the bilateral hallux  ---Patient reminded that the callus will likely return due to the underlying, prominent bone causing the callus while the patient is walking.    -High risk toenail debridement x 10 toenails without incident on 11/12/2024    -Ammonium Lactate was ordered on 10/02/2023. Patient is using the cream daily.  -CMOs: Patient has CMOs from November / December of 2023. He wears them consistently.    ----------------------------------------------    Hallux rigidus RIGHT 1st MTPJ:  -Patient would like his RIGHT plantar 1st metatarsal head callus to be permanently removed. He is also wondering how to reduce the pain on the plantar 1st MTPJ. The pain is not consistently but occasionally  flares if he steps on his foot just right.  ---Discussed how calluses develop from pressure points. Treating the callus daily with callus cream and filing the callus can help reduce the pain from the callus. CMOs can help reduce pressure which slow the progression of the callus. However, as long as excessive pressure is on that area of the foot, the callus will continue to come back.  -Patient has a functional hallux limits. The foot is  not painful when wearing his orthotics. Discussed that only surgery can correct this. An arthoplasty of the joint space is a faster recovery which may reduce the callus formation, but he would have a more floppy toe with potentially reduce balance. A 1st MTPJ would provide more stability and may reduce the callus formation, but a minimum of six weeks of NWB would be required. He is not advised to have surgery at this time since the 1st MTPJ does not hurt from motion and he does not have consistent pain at this time. He is in agreement with this plan.  -This is an acute, uncomplicated illness/injury with OTC treatment options reviewed and surgical treatment options reviewed.    -Patient in agreement with the above treatment plan and all of patient's questions were answered.      Return to clinic 63+ days for toenail debridement and callus paring        Hollie Nina DPM

## 2024-11-13 ENCOUNTER — ALLIED HEALTH/NURSE VISIT (OUTPATIENT)
Dept: AUDIOLOGY | Facility: OTHER | Age: 54
End: 2024-11-13
Attending: AUDIOLOGIST
Payer: COMMERCIAL

## 2024-11-13 ENCOUNTER — OFFICE VISIT (OUTPATIENT)
Dept: INTERNAL MEDICINE | Facility: OTHER | Age: 54
End: 2024-11-13
Attending: INTERNAL MEDICINE
Payer: COMMERCIAL

## 2024-11-13 VITALS
DIASTOLIC BLOOD PRESSURE: 82 MMHG | WEIGHT: 194.5 LBS | RESPIRATION RATE: 18 BRPM | HEART RATE: 72 BPM | OXYGEN SATURATION: 98 % | BODY MASS INDEX: 30.46 KG/M2 | SYSTOLIC BLOOD PRESSURE: 128 MMHG | TEMPERATURE: 97.6 F

## 2024-11-13 DIAGNOSIS — H91.93 DECREASED HEARING OF BOTH EARS: Primary | ICD-10-CM

## 2024-11-13 DIAGNOSIS — B07.9 VIRAL WART ON LEFT THUMB: ICD-10-CM

## 2024-11-13 DIAGNOSIS — G89.29 CHRONIC PAIN OF RIGHT THUMB: ICD-10-CM

## 2024-11-13 DIAGNOSIS — H91.93 DECREASED HEARING OF BOTH EARS: ICD-10-CM

## 2024-11-13 DIAGNOSIS — M79.644 CHRONIC PAIN OF RIGHT THUMB: ICD-10-CM

## 2024-11-13 DIAGNOSIS — M79.645 PAIN OF FINGER OF LEFT HAND: Primary | ICD-10-CM

## 2024-11-13 DIAGNOSIS — H90.3 SENSORINEURAL HEARING LOSS (SNHL) OF BOTH EARS: Primary | ICD-10-CM

## 2024-11-13 ASSESSMENT — PAIN SCALES - GENERAL: PAINLEVEL_OUTOF10: MODERATE PAIN (4)

## 2024-11-13 NOTE — PROGRESS NOTES
Audiology Evaluation Completed. Please refer SCANNED AUDIOGRAM and/or TYMPANOGRAM for BACKGROUND, RESULTS, RECOMMENDATIONS.      Loretta LOONEY, [facility]  Audiologist #[id number]

## 2024-11-13 NOTE — PROGRESS NOTES
HEARING AID CHECK    BACKGROUND:  Zaheer Mcgee, a 54 year old male, was seen today for an hearing aid check.  Mr. Mcgee wears Binaural Oticon Real 2 miniRITE R hearing aids.  Mr. Mcgee reported no concerns.    FINDINGS:  Visual inspection and cleaning provided.   C-stop changed with new. 10 mm Open domes changed with new. Battery was tested and good. Good listening check.    Reviewed cleaning, troubleshooting, and preventative care with patient. Any cleaning tools used were provided as customer courtesy.    Services performed and warranty reviewed with patient.    PLAN: Patient was seen next by the audiologist. Mr. Mcgee will return to clinic in 12 months, sooner if needed. Patient had no further questions and reports satisfaction.         Alanna Cochran  Audiology Assistant  [facility]  [phone]

## 2024-11-26 ENCOUNTER — OFFICE VISIT (OUTPATIENT)
Dept: NEUROSURGERY | Facility: CLINIC | Age: 54
End: 2024-11-26
Payer: COMMERCIAL

## 2024-11-26 VITALS — DIASTOLIC BLOOD PRESSURE: 84 MMHG | SYSTOLIC BLOOD PRESSURE: 126 MMHG | OXYGEN SATURATION: 98 % | HEART RATE: 88 BPM

## 2024-11-26 DIAGNOSIS — I67.5 MOYAMOYA DISEASE: Primary | ICD-10-CM

## 2024-11-26 PROCEDURE — 99213 OFFICE O/P EST LOW 20 MIN: CPT | Mod: GC | Performed by: NEUROLOGICAL SURGERY

## 2024-11-26 RX ORDER — BUPROPION HYDROCHLORIDE 300 MG/1
300 TABLET ORAL EVERY MORNING
COMMUNITY

## 2024-11-26 NOTE — PATIENT INSTRUCTIONS
Follow up with Dr Bhakta in 1 year   CT Angiogram Head with Contrast prior prior to Virtual Office Visit  CT Perfusion Test with and without Diamox prior to Virtual Office Visit  Testing can be performed In [facility]  near Home and follow up     Virtual Visit Follow up with Dr Bhakta.    Orders Entered    Call Paradise RN  Neurosurgery Care Coordinator with questions/concerns     Thank you for using [facility]

## 2024-11-26 NOTE — LETTER
"11/26/2024       RE: Zaheer Mcgee  [address] [place] [address]     Dear Colleague,    Thank you for referring your patient, Zaheer Mcgee, to the [facility]UROS[facility] [place] at [facility]. Please see a copy of my visit note below.    11/26/2024  Neuro-endovascular Clinic Visit    Chief Complaint: moyamoya syndrome    History of present illness:  Zaheer Mcgee is a 54 year old male with known history of moyamoya syndrome presenting for follow up. He was last seen in 1/2024.  Patient was noted to have moyamoya collaterals as part of diagnostic workup for hearing problem, showing tapering of the proximal supraclinoid segment of R ICA tapering with anterograde filling of the ophthalmic. There was also occlusion of L M1 trunk and hypertrophied lenticulostriates. He has collateral filling mostly from the posterior circulation. He denies stroke like symptoms and has never had a stroke in the past. He had a CTP with diamox a year ago which did not show any perfusion deficits. He notes that he was recently laid off from his job and is catching up on his \"medical care.\"    Physical exam:   /84 (BP Location: Right arm, Patient Position: Sitting, Cuff Size: Adult Large)   Pulse 88   SpO2 98%   General: Awake and alert and in no acute distress.  Pulm: Breathing comfortably on room air  A&o x4  Speech without aphasia or dysarthria  CN- EOMI, VF intact, face symmetric, hearing intact, tongue midline  Motor- antigravity throughout  Sensation- intact to light touch  Coordination- finger to nose without ataxia  Gait- normal    Imaging:  All previous imaging pertinent to this encounter reviewed.    Assessment/Plan:  54 year old male with known history of moyamoya syndrome presenting for follow up. Continues to be asymptomatic with a nonfocal neuro exam.  - CTA head perfusion with diamox in 1 year followed by virtual clinic visit (per pt " request)  - continue aspirin 81mg daily  - encouraged smoking cessation    Patient seen and discussed with Dr. Bhakta.  Danni Burgos MD  Fellow, Endovascular Surgical Neuroradiology              REVIEWED ASSOCIATED CLINICAL DATA AND HISTORY FOUND IN THE MEDICAL RECORD:  Past Medical History:   Past Medical History:   Diagnosis Date     Chiari I malformation (H)       (central serous retinopathy)     LE     Nonsenile cataract        Surgical History:   Past Surgical History:   Procedure Laterality Date     COLONOSCOPY - HIM SCAN N/A 03/01/2008    Colonoscopy lesion remove  3/2008     COLONOSCOPY - HIM SCAN N/A 03/09/2017    Colonoscopy screening 5 year recall due 3/2022     groin surgery  01/01/1999     IR CAROTID CEREBRAL ANGIOGRAM BILATERAL  11/13/2023     IR LUMBAR PUNCTURE  11/13/2023     STRABISMUS SURGERY Right 01/01/1972       Social history:   Social History     Tobacco Use     Smoking status: Former     Current packs/day: 2.00     Types: Cigarettes     Smokeless tobacco: Never   Substance Use Topics     Alcohol use: Not Currently       Family history:   Family History   Problem Relation Age of Onset     Mental Illness Mother      Hypertension Mother      Cerebrovascular Disease Mother      Glaucoma Mother      Arthritis Mother      Osteoporosis Mother      Arthritis Father      Mental Illness Father      Hypertension Father      Arthritis Sister      Mental Illness Sister      Diabetes Maternal Grandmother      Diabetes Paternal Grandmother        Medications:  Current Outpatient Medications   Medication Sig Dispense Refill     Acetaminophen 325 MG CAPS Take 325-650 mg by mouth every 4 hours as needed       ammonium lactate (AMLACTIN) 12 % external cream Apply topically 2 times daily 385 g 3     buPROPion (WELLBUTRIN XL) 300 MG 24 hr tablet Take 300 mg by mouth every morning.       busPIRone (BUSPAR) 5 MG tablet Take 5 mg by mouth 3 times daily       famotidine (PEPCID) 20 MG tablet TAKE (1) TABLET  BY MOUTH TWICE A DAY. 90 tablet 1     lactase (LACTAID) 3000 UNIT tablet Take 1 tablet (3,000 Units) by mouth 3 times daily (with meals) 30 tablet 1     loratadine (CLARITIN) 10 MG tablet Take 10 mg by mouth daily       omeprazole (PRILOSEC) 40 MG DR capsule Take 1 capsule (40 mg) by mouth daily. 90 capsule 1     traMADol (ULTRAM) 50 MG tablet TAKE 1 TABLET BY MOUTH TWICE DAILY AS NEEDED FOR SEVERE PAIN 46 tablet 0     No current facility-administered medications for this visit.       Allergies:   No Known Allergies       Again, thank you for allowing me to participate in the care of your patient.      Sincerely,    Sergio Bhakta MD

## 2024-11-26 NOTE — PROGRESS NOTES
"11/26/2024  Neuro-endovascular Clinic Visit    Chief Complaint: moyamoya syndrome    History of present illness:  Zaheer Mcgee is a 54 year old male with known history of moyamoya syndrome presenting for follow up. He was last seen in 1/2024.  Patient was noted to have moyamoya collaterals as part of diagnostic workup for hearing problem, showing tapering of the proximal supraclinoid segment of R ICA tapering with anterograde filling of the ophthalmic. There was also occlusion of L M1 trunk and hypertrophied lenticulostriates. He has collateral filling mostly from the posterior circulation. He denies stroke like symptoms and has never had a stroke in the past. He had a CTP with diamox a year ago which did not show any perfusion deficits. He notes that he was recently laid off from his job and is catching up on his \"medical care.\"    Physical exam:   /84 (BP Location: Right arm, Patient Position: Sitting, Cuff Size: Adult Large)   Pulse 88   SpO2 98%   General: Awake and alert and in no acute distress.  Pulm: Breathing comfortably on room air  A&o x4  Speech without aphasia or dysarthria  CN- EOMI, VF intact, face symmetric, hearing intact, tongue midline  Motor- antigravity throughout  Sensation- intact to light touch  Coordination- finger to nose without ataxia  Gait- normal    Imaging:  All previous imaging pertinent to this encounter reviewed.    Assessment/Plan:  54 year old male with known history of moyamoya syndrome presenting for follow up. Continues to be asymptomatic with a nonfocal neuro exam.  - CTA head perfusion with diamox in 1 year followed by virtual clinic visit (per pt request)  - continue aspirin 81mg daily  - encouraged smoking cessation    Patient seen and discussed with Dr. Bhakta.  Danni Burgos MD  Fellow, Endovascular Surgical Neuroradiology              REVIEWED ASSOCIATED CLINICAL DATA AND HISTORY FOUND IN THE MEDICAL RECORD:  Past Medical History:   Past Medical " History:   Diagnosis Date    Chiari I malformation (H)      (central serous retinopathy)     LE    Nonsenile cataract        Surgical History:   Past Surgical History:   Procedure Laterality Date    COLONOSCOPY - HIM SCAN N/A 03/01/2008    Colonoscopy lesion remove  3/2008    COLONOSCOPY - HIM SCAN N/A 03/09/2017    Colonoscopy screening 5 year recall due 3/2022    groin surgery  01/01/1999    IR CAROTID CEREBRAL ANGIOGRAM BILATERAL  11/13/2023    IR LUMBAR PUNCTURE  11/13/2023    STRABISMUS SURGERY Right 01/01/1972       Social history:   Social History     Tobacco Use    Smoking status: Former     Current packs/day: 2.00     Types: Cigarettes    Smokeless tobacco: Never   Substance Use Topics    Alcohol use: Not Currently       Family history:   Family History   Problem Relation Age of Onset    Mental Illness Mother     Hypertension Mother     Cerebrovascular Disease Mother     Glaucoma Mother     Arthritis Mother     Osteoporosis Mother     Arthritis Father     Mental Illness Father     Hypertension Father     Arthritis Sister     Mental Illness Sister     Diabetes Maternal Grandmother     Diabetes Paternal Grandmother        Medications:  Current Outpatient Medications   Medication Sig Dispense Refill    Acetaminophen 325 MG CAPS Take 325-650 mg by mouth every 4 hours as needed      ammonium lactate (AMLACTIN) 12 % external cream Apply topically 2 times daily 385 g 3    buPROPion (WELLBUTRIN XL) 300 MG 24 hr tablet Take 300 mg by mouth every morning.      busPIRone (BUSPAR) 5 MG tablet Take 5 mg by mouth 3 times daily      famotidine (PEPCID) 20 MG tablet TAKE (1) TABLET BY MOUTH TWICE A DAY. 90 tablet 1    lactase (LACTAID) 3000 UNIT tablet Take 1 tablet (3,000 Units) by mouth 3 times daily (with meals) 30 tablet 1    loratadine (CLARITIN) 10 MG tablet Take 10 mg by mouth daily      omeprazole (PRILOSEC) 40 MG DR capsule Take 1 capsule (40 mg) by mouth daily. 90 capsule 1    traMADol (ULTRAM) 50 MG tablet  TAKE 1 TABLET BY MOUTH TWICE DAILY AS NEEDED FOR SEVERE PAIN 46 tablet 0     No current facility-administered medications for this visit.       Allergies:   No Known Allergies

## 2024-12-09 DIAGNOSIS — G89.29 OTHER CHRONIC PAIN: ICD-10-CM

## 2024-12-09 NOTE — TELEPHONE ENCOUNTER
Routing refill request to provider for review/approval because:  Drug not on the [facility], [facility] or [facility] refill protocol or controlled substance

## 2024-12-09 NOTE — TELEPHONE ENCOUNTER
Tramadol      Last Written Prescription Date:  10/29/24  Last Fill Quantity: 45,   # refills: 0  Last Office Visit: 11/13/24  Future Office visit:    Next 5 appointments (look out 90 days)      Jan 14, 2025 8:00 AM  (Arrive by 7:45 AM)  Return Visit with Hollie Nina DPM  [facility] [address] ([facility] ) [address] [place] [id number] [phone]             Routing refill request to provider for review/approval because:

## 2024-12-11 RX ORDER — TRAMADOL HYDROCHLORIDE 50 MG/1
50 TABLET ORAL 2 TIMES DAILY PRN
Qty: 46 TABLET | Refills: 0 | Status: SHIPPED | OUTPATIENT
Start: 2024-12-11

## 2024-12-16 ENCOUNTER — OFFICE VISIT (OUTPATIENT)
Dept: PODIATRY | Facility: OTHER | Age: 54
End: 2024-12-16
Attending: PODIATRIST
Payer: COMMERCIAL

## 2024-12-16 VITALS
TEMPERATURE: 96.5 F | DIASTOLIC BLOOD PRESSURE: 78 MMHG | HEART RATE: 76 BPM | OXYGEN SATURATION: 98 % | SYSTOLIC BLOOD PRESSURE: 123 MMHG

## 2024-12-16 DIAGNOSIS — L97.511 ULCER OF RIGHT FOOT, LIMITED TO BREAKDOWN OF SKIN (H): Primary | ICD-10-CM

## 2024-12-16 DIAGNOSIS — L84 CALLUS OF FOOT: ICD-10-CM

## 2024-12-16 ASSESSMENT — PAIN SCALES - GENERAL: PAINLEVEL_OUTOF10: MILD PAIN (3)

## 2024-12-16 NOTE — PROGRESS NOTES
Chief complaint: Patient presents with:  Musculoskeletal Problem: Callus       History of Present Illness: This 54 year old male is seen for paring of paring of painful calluses.    He is here today to address the painful callus on the RIGHT lateral heel.    He has been using Ammonium Lactate cream daily and it helps reduce the pain. It still works for him but the callus keeps coming back. However, the RIGHT lateral heel has cracked open and has been painful.    He was dispensed CMOs around November, 2023. The CMOs are comfortable.      No further pedal complaints today.     Historically:  Patient was previously living in [place] and he says another podiatrist, Dr. Iraheta (at [facility] through [facility]), gave him an anti-fungal for his toenails as well as a cream for his calluses. She previously pared his calluses, advised him to use antiperspirant spray on his feet, Aluminum chloride to stop the sweaty feet, antifungal creams (Terbinafine) in the evenings, and Urea Cream 20% on the calluses.      /78 (BP Location: Left arm, Patient Position: Sitting, Cuff Size: Adult Regular)   Pulse 76   Temp (!) 96.5  F (35.8  C) (Tympanic)   SpO2 98%     Patient Active Problem List   Diagnosis    Central serous retinopathy    Cataract    ADHD    Anxiety    Bilateral hearing loss    GERD (gastroesophageal reflux disease)    HTN (hypertension)    ZOFIA (obstructive sleep apnea)    Moyamoya disease    Chronic, continuous use of opioids       Past Surgical History:   Procedure Laterality Date    COLONOSCOPY - HIM SCAN N/A 03/01/2008    Colonoscopy lesion remove  3/2008    COLONOSCOPY - HIM SCAN N/A 03/09/2017    Colonoscopy screening 5 year recall due 3/2022    groin surgery  01/01/1999    IR CAROTID CEREBRAL ANGIOGRAM BILATERAL  11/13/2023    IR LUMBAR PUNCTURE  11/13/2023    STRABISMUS SURGERY Right 01/01/1972       Current Outpatient Medications   Medication Sig Dispense Refill    Acetaminophen 325 MG CAPS  Take 325-650 mg by mouth every 4 hours as needed      ammonium lactate (AMLACTIN) 12 % external cream Apply topically 2 times daily 385 g 3    buPROPion (WELLBUTRIN XL) 300 MG 24 hr tablet Take 300 mg by mouth every morning.      busPIRone (BUSPAR) 5 MG tablet Take 5 mg by mouth 3 times daily      famotidine (PEPCID) 20 MG tablet TAKE (1) TABLET BY MOUTH TWICE A DAY. 90 tablet 1    lactase (LACTAID) 3000 UNIT tablet Take 1 tablet (3,000 Units) by mouth 3 times daily (with meals) 30 tablet 1    loratadine (CLARITIN) 10 MG tablet Take 10 mg by mouth daily      omeprazole (PRILOSEC) 40 MG DR capsule Take 1 capsule (40 mg) by mouth daily. 90 capsule 1    traMADol (ULTRAM) 50 MG tablet TAKE 1 TABLET BY MOUTH TWICE DAILY AS NEEDED FOR SEVERE PAIN 46 tablet 0     No current facility-administered medications for this visit.        No Known Allergies    Family History   Problem Relation Age of Onset    Mental Illness Mother     Hypertension Mother     Cerebrovascular Disease Mother     Glaucoma Mother     Arthritis Mother     Osteoporosis Mother     Arthritis Father     Mental Illness Father     Hypertension Father     Arthritis Sister     Mental Illness Sister     Diabetes Maternal Grandmother     Diabetes Paternal Grandmother        Social History     Socioeconomic History    Marital status:      Spouse name: None    Number of children: None    Years of education: None    Highest education level: None   Tobacco Use    Smoking status: Former     Packs/day: 2.00     Types: Cigarettes    Smokeless tobacco: Never     Social Determinants of Health     Financial Resource Strain: Low Risk  (9/27/2023)    Financial Resource Strain     Within the past 12 months, have you or your family members you live with been unable to get utilities (heat, electricity) when it was really needed?: No   Food Insecurity: Low Risk  (9/27/2023)    Food Insecurity     Within the past 12 months, did you worry that your food would run out before  you got money to buy more?: No     Within the past 12 months, did the food you bought just not last and you didn t have money to get more?: No   Transportation Needs: Low Risk  (9/27/2023)    Transportation Needs     Within the past 12 months, has lack of transportation kept you from medical appointments, getting your medicines, non-medical meetings or appointments, work, or from getting things that you need?: No   Interpersonal Safety: Low Risk  (9/27/2023)    Interpersonal Safety     Do you feel physically and emotionally safe where you currently live?: Yes     Within the past 12 months, have you been hit, slapped, kicked or otherwise physically hurt by someone?: No     Within the past 12 months, have you been humiliated or emotionally abused in other ways by your partner or ex-partner?: No   Housing Stability: Low Risk  (9/27/2023)    Housing Stability     Do you have housing? : Yes     Are you worried about losing your housing?: No       ROS: 10 point ROS neg other than the symptoms noted above in the HPI.  EXAM  Constitutional: healthy, alert, and no distress    Psychiatric: mentation appears normal and affect normal/bright    VASCULAR:  -Dorsalis pedis pulse +2/4 b/l  -Posterior tibial pulse +2/4 b/l  -Capillary refill time < 3 seconds to b/l hallux  NEURO:  -Light touch sensation intact to b/l plantar forefoot  DERM:  -Skin temperature, texture and turgor WNL b/l  -Toenails thickened and dystrophic x 10    Fissure with open wound on the RIGHT lateral heel: 0.3cm x 0.1cm x superficial through skin layer  ---Mild serous drainage  ---No severe erythema, no ascending erythema, no calor, no purulence, no malodor, no other signs of infection.     MSK:  -Pain on palpation to the RIGHT lateral heel  -DORSIFLEXION contracture to MTPJ 2-5 b/l with flexion contracture to PIPJ of digits 2-5 b/l  ---RIGHT third toe is a flexible contracture at the PIPJ and mildly flexible at the DIPJ  -RIGHT 1st MTPJ ROM within normal  limits without forefoot loading but limited to less than 15 degrees with forefoot loading  -Muscle strength of ankles +5/5 for dorsiflexion, plantarflexion, ABDUction and ADDuction b/l    ============================================================    ASSESSMENT:    (L97.323) Ulcer of right foot, limited to breakdown of skin (H)  (primary encounter diagnosis)    (L84) Callus of foot        PLAN:  -Patient evaluated and examined. Treatment options discussed with no educational barriers noted.    RIGHT lateral heel foot wound:  -Outer callus cared for with curette. There is an open fissure through the skin layer.  -Applied a dressing to the wound with triple antibiotic ointment, gauze and tape.  -Patient to change the dressing every day until healed without an open wound.  -Patient was instructed to look for signs of infection (redness, swelling, pain, purulence, fever, chills, nausea, vomiting) and to return to podiatry or the emergency department immediately if there are any signs of infection.     -After wound is healed, file the callus daily with an [place] board. Continue with Ammonium Lactate. Cover with gauze and a band aid.    -This is an acute, uncomplicated illness/injury with OTC treatment options reviewed.     -Patient in agreement with the above treatment plan and all of patient's questions were answered.      Return to clinic at scheduled 63+ day appointment for toenail debridement and callus paring        Hollie Nina DPM

## 2025-01-09 DIAGNOSIS — H35.713 CENTRAL SEROUS CHORIORETINOPATHY OF BOTH EYES: Primary | ICD-10-CM

## 2025-01-09 DIAGNOSIS — H40.003 GLAUCOMA SUSPECT OF BOTH EYES: ICD-10-CM

## 2025-01-14 ENCOUNTER — ALLIED HEALTH/NURSE VISIT (OUTPATIENT)
Dept: AUDIOLOGY | Facility: OTHER | Age: 55
End: 2025-01-14
Attending: PHYSICIAN ASSISTANT
Payer: COMMERCIAL

## 2025-01-14 ENCOUNTER — OFFICE VISIT (OUTPATIENT)
Dept: PODIATRY | Facility: OTHER | Age: 55
End: 2025-01-14
Attending: PODIATRIST
Payer: COMMERCIAL

## 2025-01-14 VITALS
DIASTOLIC BLOOD PRESSURE: 82 MMHG | OXYGEN SATURATION: 98 % | TEMPERATURE: 98.4 F | SYSTOLIC BLOOD PRESSURE: 130 MMHG | HEART RATE: 81 BPM

## 2025-01-14 DIAGNOSIS — H91.93 DECREASED HEARING OF BOTH EARS: Primary | ICD-10-CM

## 2025-01-14 DIAGNOSIS — L60.3 ONYCHODYSTROPHY: ICD-10-CM

## 2025-01-14 DIAGNOSIS — L84 CALLUS OF FOOT: Primary | ICD-10-CM

## 2025-01-14 ASSESSMENT — PAIN SCALES - GENERAL: PAINLEVEL_OUTOF10: NO PAIN (0)

## 2025-01-14 NOTE — PROGRESS NOTES
HEARING AID CHECK    BACKGROUND:  Zaheer Mcgee, a 54 year old male, was seen today for an hearing aid check.  Mr. Mcgee wears Binaural Oticon Real 2 miniRITE R.  Mr. Mcgee reported hearing aids not working.    FINDINGS:  Visual inspection and cleaning provided.     Reviewed cleaning, troubleshooting, and preventative care with patient. Any cleaning tools used were provided as customer courtesy.    Services performed and warranty reviewed with patient.    PLAN:  Based on patient report, no audiology appointment for adjustments needed.Mr. Mcgee's hearing aids are being sent out for repair under warranty. Patient will be notified when hearing aids come back from repair. Patient had no further questions and reports satisfaction.         Alanna Cochran  Audiology Assistant  [facility]  [phone]

## 2025-01-14 NOTE — PROGRESS NOTES
Chief complaint: Patient presents with:  Musculoskeletal Problem: Callus       History of Present Illness: This 54 year old male is seen for paring of paring of painful calluses.    He is here today to address the painful callus on the bilateral posterior heel and the RIGHT plantar 1st metatarsal head.    He has been using Ammonium Lactate cream daily and it helps reduce the pain.    He was dispensed CMOs around November, 2023. The CMOs are still comfortable.    No further pedal complaints today.     Historically:  Patient was previously living in [place] and he says another podiatrist, Dr. Iraheta (at [facility]), gave him an anti-fungal for his toenails as well as a cream for his calluses. She previously pared his calluses, advised him to use antiperspirant spray on his feet, Aluminum chloride to stop the sweaty feet, antifungal creams (Terbinafine) in the evenings, and Urea Cream 20% on the calluses.      /82 (BP Location: Left arm, Patient Position: Sitting, Cuff Size: Adult Regular)   Pulse 81   Temp 98.4  F (36.9  C) (Tympanic)   SpO2 98%     Patient Active Problem List   Diagnosis    Central serous retinopathy    Cataract    ADHD    Anxiety    Bilateral hearing loss    GERD (gastroesophageal reflux disease)    HTN (hypertension)    ZOFIA (obstructive sleep apnea)    Moyamoya disease    Chronic, continuous use of opioids       Past Surgical History:   Procedure Laterality Date    COLONOSCOPY - HIM SCAN N/A 03/01/2008    Colonoscopy lesion remove  3/2008    COLONOSCOPY - HIM SCAN N/A 03/09/2017    Colonoscopy screening 5 year recall due 3/2022    groin surgery  01/01/1999    IR CAROTID CEREBRAL ANGIOGRAM BILATERAL  11/13/2023    IR LUMBAR PUNCTURE  11/13/2023    STRABISMUS SURGERY Right 01/01/1972       Current Outpatient Medications   Medication Sig Dispense Refill    Acetaminophen 325 MG CAPS Take 325-650 mg by mouth every 4 hours as needed      ammonium lactate (AMLACTIN)  12 % external cream Apply topically 2 times daily 385 g 3    buPROPion (WELLBUTRIN XL) 300 MG 24 hr tablet Take 300 mg by mouth every morning.      busPIRone (BUSPAR) 5 MG tablet Take 5 mg by mouth 3 times daily      famotidine (PEPCID) 20 MG tablet TAKE (1) TABLET BY MOUTH TWICE A DAY. 90 tablet 1    lactase (LACTAID) 3000 UNIT tablet Take 1 tablet (3,000 Units) by mouth 3 times daily (with meals) 30 tablet 1    loratadine (CLARITIN) 10 MG tablet Take 10 mg by mouth daily      omeprazole (PRILOSEC) 40 MG DR capsule Take 1 capsule (40 mg) by mouth daily. 90 capsule 1    traMADol (ULTRAM) 50 MG tablet TAKE 1 TABLET BY MOUTH TWICE DAILY AS NEEDED FOR SEVERE PAIN 46 tablet 0     No current facility-administered medications for this visit.        No Known Allergies    Family History   Problem Relation Age of Onset    Mental Illness Mother     Hypertension Mother     Cerebrovascular Disease Mother     Glaucoma Mother     Arthritis Mother     Osteoporosis Mother     Arthritis Father     Mental Illness Father     Hypertension Father     Arthritis Sister     Mental Illness Sister     Diabetes Maternal Grandmother     Diabetes Paternal Grandmother        Social History     Socioeconomic History    Marital status:      Spouse name: None    Number of children: None    Years of education: None    Highest education level: None   Tobacco Use    Smoking status: Former     Packs/day: 2.00     Types: Cigarettes    Smokeless tobacco: Never     Social Determinants of Health     Financial Resource Strain: Low Risk  (9/27/2023)    Financial Resource Strain     Within the past 12 months, have you or your family members you live with been unable to get utilities (heat, electricity) when it was really needed?: No   Food Insecurity: Low Risk  (9/27/2023)    Food Insecurity     Within the past 12 months, did you worry that your food would run out before you got money to buy more?: No     Within the past 12 months, did the food you  bought just not last and you didn t have money to get more?: No   Transportation Needs: Low Risk  (9/27/2023)    Transportation Needs     Within the past 12 months, has lack of transportation kept you from medical appointments, getting your medicines, non-medical meetings or appointments, work, or from getting things that you need?: No   Interpersonal Safety: Low Risk  (9/27/2023)    Interpersonal Safety     Do you feel physically and emotionally safe where you currently live?: Yes     Within the past 12 months, have you been hit, slapped, kicked or otherwise physically hurt by someone?: No     Within the past 12 months, have you been humiliated or emotionally abused in other ways by your partner or ex-partner?: No   Housing Stability: Low Risk  (9/27/2023)    Housing Stability     Do you have housing? : Yes     Are you worried about losing your housing?: No       ROS: 10 point ROS neg other than the symptoms noted above in the HPI.  EXAM  Constitutional: healthy, alert, and no distress    Psychiatric: mentation appears normal and affect normal/bright    VASCULAR:  -Dorsalis pedis pulse +2/4 b/l  -Posterior tibial pulse +2/4 b/l  -Capillary refill time < 3 seconds to b/l hallux  NEURO:  -Light touch sensation intact to b/l plantar forefoot  DERM:  -Skin temperature, texture and turgor WNL b/l  -Toenails thickened and dystrophic x 10    -Hyperkeratotic lesion on the RIGHT plantar 1st metatarsal head   -Hyperkeratotic lesion on the bilateral posterior heel    MSK:  -Pain on palpation to the RIGHT lateral heel  -DORSIFLEXION contracture to MTPJ 2-5 b/l with flexion contracture to PIPJ of digits 2-5 b/l  ---RIGHT third toe is a flexible contracture at the PIPJ and mildly flexible at the DIPJ  -RIGHT 1st MTPJ ROM within normal limits without forefoot loading but limited to less than 15 degrees with forefoot loading  -Muscle strength of ankles +5/5 for dorsiflexion, plantarflexion, ABDUction and ADDuction  b/l    ============================================================    ASSESSMENT:    (L84) Callus of foot  (primary encounter diagnosis)    (L60.3) Onychodystrophy        PLAN:  -Patient evaluated and examined. Treatment options discussed with no educational barriers noted.    -Toenail debridement x 10 toenails without incident    -Callus pared x 3 to the RIGHT plantar 1st metatarsal head and the plantar posterior heel bilaterally without incident  ---Patient reminded that the callus will likely return due to the underlying, prominent bone causing the callus while the patient is walking.    -Foot Education provided. This included checking the feet daily looking for new new blisters or wounds, wearing shoes at all times when walking including around the house, and avoiding lotion application between the toes. If there are any signs of infection, the patient should present to the ED as soon as possible. Infections of the foot can be life threatening or lead to amputations of the foot or leg.    -Patient in agreement with the above treatment plan and all of patient's questions were answered.      Return to clinic at scheduled 63+ day appointment for toenail debridement and callus sidra Nina DPM

## 2025-01-21 ENCOUNTER — ALLIED HEALTH/NURSE VISIT (OUTPATIENT)
Dept: FAMILY MEDICINE | Facility: OTHER | Age: 55
End: 2025-01-21
Attending: FAMILY MEDICINE
Payer: COMMERCIAL

## 2025-01-21 VITALS — TEMPERATURE: 99.2 F

## 2025-01-21 DIAGNOSIS — Z23 ENCOUNTER FOR IMMUNIZATION: Primary | ICD-10-CM

## 2025-01-21 DIAGNOSIS — Z23 NEED FOR PROPHYLACTIC VACCINATION AND INOCULATION AGAINST INFLUENZA: ICD-10-CM

## 2025-01-28 ENCOUNTER — ALLIED HEALTH/NURSE VISIT (OUTPATIENT)
Dept: AUDIOLOGY | Facility: OTHER | Age: 55
End: 2025-01-28
Attending: NURSE PRACTITIONER
Payer: COMMERCIAL

## 2025-01-28 DIAGNOSIS — H91.93 DECREASED HEARING OF BOTH EARS: Primary | ICD-10-CM

## 2025-01-28 DIAGNOSIS — G89.29 OTHER CHRONIC PAIN: ICD-10-CM

## 2025-01-28 RX ORDER — TRAMADOL HYDROCHLORIDE 50 MG/1
50 TABLET ORAL 2 TIMES DAILY PRN
Qty: 46 TABLET | Refills: 0 | Status: SHIPPED | OUTPATIENT
Start: 2025-01-28

## 2025-01-28 NOTE — TELEPHONE ENCOUNTER
Ultram      Last Written Prescription Date:  12.11.24  Last Fill Quantity: #46,   # refills: 0  Last Office Visit: 11.13.24  Future Office visit:    Next 5 appointments (look out 90 days)      Jan 28, 2025 11:00 AM  (Arrive by 10:45 AM)  Nurse Only with Alanna Cochran  [facility] ([facility] ) [address] [place] [id number]  [phone]     Mar 20, 2025 8:30 AM  (Arrive by 8:15 AM)  Return Visit with Hollie Nina DPM  [facility] ([facility] ) [address] [place] [id number] [phone]             Routing refill request to provider for review/approval because:  Drug not on the [facility], [facility] or [facility] refill protocol or controlled substance

## 2025-01-28 NOTE — PROGRESS NOTES
Background:  Received repaired Binaural Oticon Real 2 miniRITE R.       Procedures Performed:  The  completed the following repairs: Replaced amplifiers, battery, and  / speaker unit. Reprogrammed to user settings.    Follow up:   Mr. Mcgee was called for hearing aid  at .  Return to clinic as needed.      Alanna Cochran  Audiology Assistant  [facility]  [phone]

## 2025-02-03 ENCOUNTER — OFFICE VISIT (OUTPATIENT)
Dept: OPHTHALMOLOGY | Facility: CLINIC | Age: 55
End: 2025-02-03
Attending: OPHTHALMOLOGY
Payer: COMMERCIAL

## 2025-02-03 DIAGNOSIS — H35.713 CENTRAL SEROUS CHORIORETINOPATHY OF BOTH EYES: ICD-10-CM

## 2025-02-03 DIAGNOSIS — H40.003 GLAUCOMA SUSPECT OF BOTH EYES: Primary | ICD-10-CM

## 2025-02-03 DIAGNOSIS — H10.13 ALLERGIC CONJUNCTIVITIS OF BOTH EYES: ICD-10-CM

## 2025-02-03 PROCEDURE — 92083 EXTENDED VISUAL FIELD XM: CPT | Performed by: OPHTHALMOLOGY

## 2025-02-03 PROCEDURE — 92134 CPTRZ OPH DX IMG PST SGM RTA: CPT | Performed by: OPHTHALMOLOGY

## 2025-02-03 PROCEDURE — 99213 OFFICE O/P EST LOW 20 MIN: CPT | Performed by: OPHTHALMOLOGY

## 2025-02-03 PROCEDURE — 99214 OFFICE O/P EST MOD 30 MIN: CPT | Mod: GC | Performed by: OPHTHALMOLOGY

## 2025-02-03 RX ORDER — OLOPATADINE HYDROCHLORIDE 1 MG/ML
1 SOLUTION/ DROPS OPHTHALMIC 2 TIMES DAILY
Qty: 5 ML | Refills: 3 | Status: SHIPPED | OUTPATIENT
Start: 2025-02-03

## 2025-02-03 ASSESSMENT — REFRACTION_WEARINGRX
OD_AXIS: 112
OD_AXIS: 025
OS_SPHERE: +0.50
OD_ADD: +2.25
OD_ADD: +2.25
OD_CYLINDER: +4.25
OD_SPHERE: -0.25
OS_AXIS: 105
OS_AXIS: 007
OS_ADD: +2.25
OS_CYLINDER: +1.50
OS_CYLINDER: -1.25
OD_CYLINDER: -3.75
OS_SPHERE: -0.75
OD_SPHERE: +4.25
OS_ADD: +2.25
SPECS_TYPE: PAL

## 2025-02-03 ASSESSMENT — VISUAL ACUITY
OD_CC+: -2
METHOD: SNELLEN - LINEAR
CORRECTION_TYPE: GLASSES
OD_CC: 20/20
OS_CC: 20/40
OS_CC+: +2

## 2025-02-03 ASSESSMENT — CUP TO DISC RATIO
OS_RATIO: 0.65
OD_RATIO: 0.65

## 2025-02-03 ASSESSMENT — TONOMETRY
IOP_METHOD: ICARE
OS_IOP_MMHG: 13
OD_IOP_MMHG: 14

## 2025-02-03 ASSESSMENT — EXTERNAL EXAM - LEFT EYE: OS_EXAM: NORMAL

## 2025-02-03 ASSESSMENT — EXTERNAL EXAM - RIGHT EYE: OD_EXAM: NORMAL

## 2025-02-03 NOTE — NURSING NOTE
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     Patient states vision is stable each eye over the last 6 months. Denies new floaters or flashes. Denies eye pain. No eye drops used.     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     Patient states vision is stable each eye over the last 6 months. Denies new floaters or flashes. Denies eye pain. No eye drops used.     Shena Melgar, COT 7:40 AM 02/03/2025

## 2025-02-03 NOTE — PROGRESS NOTES
CC: follow up of  and glaucoma follow up     Interval History Feb 3, 2025: No acute changes in vision, no flashes. New floater in the left eye for the past month  HPI: Zaheer Mcgee is a 54 year old gentleman who has a history of vision loss in the left eye since 2006.  Thought to be Central serous retinopathy     Imaging:     OCT macula 02/03/25   RIGHT EYE: good foveal contour.  There were no cystic changes.    LEFT EYE:  good foveal contour. Stable appearance from last OCT--disruption of the IS/OS junction and retinal pigment epithelium. Thick choroid. Small shallow pigment epithelial detachment - stable    OVF 24-2 02/03/25    OD: MD 0.7 reliable, normal    OS: MD 0.1 reliable; paracentral spot of decreased sensitivity - could be secondary to retinal pigment epithelium changes versus artifact     OCT RNFL 05/06/24   OD: normal thickness throughout  OS: temporal borderline thinning  Could be secondary to history of Central serous retinopathy?    AF 05/06/24   Right eye: no abnormalities  Left eye: macula Retinal pigment epithelium atrophy (hypoautofluorescence) with surrounding hyperfluorescence.     Ultrasound 10/16/23  Hyperechoic areas (calcification) in posterior pole appear grossly stable from prior U/S (09/18). Vitreous debris c/w PVD both eyes.  Left eye there is an area of focal chorioretinal elevation noted with marker that is approximately mid-periphery superiorly (nystagmus makes marking precise location difficult).   No patent tears, holes or breaks in area noted.   Retina otherwise appears attached, negative for other CR elevations.     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  Differential diagnosis: other causes of maculopathy. No progression  No obvious choroidal lesion, diffuse Retinal pigment epithelium changes; thick choroid  No history of plaquenil intake  Used Flonase nasal spray rarely - hasn't used in years  Patient with history of sleep  apnea- diagnosed 2 ys ago. Uses CPAP  Thick choroid left eye > right eye    Stable distortion   Stable exam and Optical Coherence Tomography. Pigment epithelial detachment without subretinal fluid.   observe   Continue using Amsler grid    Stop caffeine intake - drinks coffee daily 1 travel mug (24oz); stop smoking   Stable     # Cataract, both eyes  Glare Testing   Medium High   Right 20/20 20/25   Left 20/30 20/40-2   becoming visually significant   monitor       # glaucoma suspect OU  - Based in increased c/d ratio   - Tmax 22/21  - FH: Mother with glaucoma (possibly)  - History of taking steroids   - Pachy: 513/544  - Last HVF 03/09/23: new superior arcuate right eye- improved after removing mask and repeated testing; OS normal (first time visual field - reliable OU)  - Last OCT RNFL 03/09/23: OD normal, OS temporal borderline thinning  - Hx of medication intolerance: none  - Hx of kidney stones or asthma: no  - Personal/family hx sickle cell: no  - Hx eye trauma: none  - Gonio: Open to  both eyes 02/03/25   - RNFL without thinning right eye; repeated visual field right eye showed improvement.  OS VF defects and RNFL thinning likely related to  related atrophy rather than glaucomatous change so will not start drops.    Recommend alternating glaucoma testing    # choroidal calcifications: detected on ultrasound     # History of Chiari I malformation based on MRI findings  # history of moyamoya  Imaging from 11/2023  Findings suggestive of moyamoya disease. Specifically, on the right, and there is a patent but somewhat small caliber and collapsed internal carotid. Filling persists to the level of the ophthalmic artery origin, where there is then antegrade filling of the ophthalmic artery itself but no antegrade filling beyond this. There is a large right posterior communicating artery, which does supply a somewhat tapered appearing distal supraclinoid internal carotid, with fairly brisk filling of a  normal-appearing right M1 segment and middle cerebral artery branches.   On the left, there is complete occlusion of the M1 segment, with some hypertrophy lenticulostriates providing collateral filling of a small anterior temporal branch. The anterior cerebral arteries are also widely patent and large in caliber, both filling from the left. These provide extensive leptomeningeal collateral filling of the left middle cerebral artery, which fills retrogradely.   Normal posterior circulation, with no evidence of branch occlusion or stenosis in the posterior circulation. Intracranially, no significant plaque or stenosis of either carotid bifurcation. No evidence of carotid or vertebral dissection. Normal external carotid circulation bilaterally with standard anatomy.     # allergic conjunctivitis, bilateral  Patanol prescribed 02/03/25   Recommended alternating warm compresses and cold compresses     PLAN:  Dilated 05/06/24   Follow up with retina 6 months with dilation, Optos, Optical Coherence Tomography macula, and Optical Coherence Tomography retinal nerve fiber layer   Retina detachment precautions were discussed with the patient (presence or increased in flashes, floaters or a curtain in the visual field) and was asked to return if any of the those occur     Hillary Ortega MD  PGY-3     ~~~~~~~~~~~~~~~~~~~~~~~~~~~~~~~~~~   Complete documentation of historical and exam elements from today's encounter can be found in the full encounter summary report (not reduplicated in this progress note).  I personally obtained the chief complaint(s) and history of present illness.  I confirmed and edited as necessary the review of systems, past medical/surgical history, family history, social history, and examination findings as documented by others; and I examined the patient myself.  I personally reviewed the relevant tests, images, and reports as documented above.  I formulated and edited as necessary the assessment and plan and  discussed the findings and management plan with the patient and family    Diana Patrick MD   of Ophthalmology.  Retina Service   Department of Ophthalmology and Visual Neurosciences   [facility]  Phone: [phone]   Fax: [phone]

## 2025-03-10 DIAGNOSIS — L84 CALLUS OF FOOT: ICD-10-CM

## 2025-03-10 DIAGNOSIS — G89.29 OTHER CHRONIC PAIN: ICD-10-CM

## 2025-03-10 RX ORDER — AMMONIUM LACTATE 12 G/100G
CREAM TOPICAL 2 TIMES DAILY
Qty: 385 G | Refills: 1 | Status: SHIPPED | OUTPATIENT
Start: 2025-03-10

## 2025-03-10 RX ORDER — AMMONIUM LACTATE 12 G/100G
CREAM TOPICAL 2 TIMES DAILY
Qty: 385 G | Refills: 0 | OUTPATIENT
Start: 2025-03-10

## 2025-03-10 NOTE — TELEPHONE ENCOUNTER
Amlactin      Last Written Prescription Date:  3/10/25  Last Fill Quantity: 385g,   # refills: 1  Last Office Visit: 11/13/24  Future Office visit:    Next 5 appointments (look out 90 days)      Mar 20, 2025 8:30 AM  (Arrive by 8:15 AM)  Return Visit with Hollie Nina DPM  [facility] ([facility] ) [address] [place] [id number] [phone]             Routing refill request to provider for review/approval because:

## 2025-03-10 NOTE — TELEPHONE ENCOUNTER
Patient has appointment upcoming to Establish Care with Dr. Chino on 5/21/25.    TRAMADOL HCL 50 MG TABLET         Last Written Prescription Date:  1/28/25  Last Fill Quantity: 46,   # refills: 0  Last Office Visit: 11/13/24  Future Office visit:    Next 5 appointments (look out 90 days)      Mar 20, 2025 8:30 AM  (Arrive by 8:15 AM)  Return Visit with Hollie Nina DPM  [facility] ([facility] ) [address] [place] [id number] [phone]             Routing refill request to provider for review/approval because:  Drug not on the [facility], P or  Health refill protocol or controlled substance

## 2025-03-12 RX ORDER — TRAMADOL HYDROCHLORIDE 50 MG/1
50 TABLET ORAL 2 TIMES DAILY PRN
Qty: 20 TABLET | Refills: 0 | Status: SHIPPED | OUTPATIENT
Start: 2025-03-12

## 2025-03-20 ENCOUNTER — OFFICE VISIT (OUTPATIENT)
Dept: PODIATRY | Facility: OTHER | Age: 55
End: 2025-03-20
Attending: PODIATRIST
Payer: COMMERCIAL

## 2025-03-20 VITALS
TEMPERATURE: 98.1 F | DIASTOLIC BLOOD PRESSURE: 82 MMHG | HEART RATE: 79 BPM | SYSTOLIC BLOOD PRESSURE: 145 MMHG | OXYGEN SATURATION: 98 %

## 2025-03-20 DIAGNOSIS — L60.3 ONYCHODYSTROPHY: ICD-10-CM

## 2025-03-20 DIAGNOSIS — L84 CALLUS OF FOOT: Primary | ICD-10-CM

## 2025-03-20 ASSESSMENT — PAIN SCALES - GENERAL: PAINLEVEL_OUTOF10: MILD PAIN (1)

## 2025-03-20 NOTE — PROGRESS NOTES
Chief complaint: Patient presents with:  Toenail      History of Present Illness: This 55 year old male is seen for paring of paring of painful calluses.    He is here today to address the painful callus on the RIGHT RIGHT plantar 1st metatarsal head and medial proximal hallux.    He has been using Ammonium Lactate cream daily and it helps reduce the pain.    He was dispensed CMOs around November, 2023. The CMOs are still comfortable.    No further pedal complaints today.     Historically:  Patient was previously living in [place] and he says another podiatrist, Dr. Iraheta (at [facility]), gave him an anti-fungal for his toenails as well as a cream for his calluses. She previously pared his calluses, advised him to use antiperspirant spray on his feet, Aluminum chloride to stop the sweaty feet, antifungal creams (Terbinafine) in the evenings, and Urea Cream 20% on the calluses.      BP (!) 145/82 (BP Location: Left arm, Patient Position: Sitting, Cuff Size: Adult Regular)   Pulse 79   Temp 98.1  F (36.7  C) (Tympanic)   SpO2 98%     Patient Active Problem List   Diagnosis    Central serous retinopathy    Cataract    ADHD    Anxiety    Bilateral hearing loss    GERD (gastroesophageal reflux disease)    HTN (hypertension)    ZOFIA (obstructive sleep apnea)    Moyamoya disease    Chronic, continuous use of opioids       Past Surgical History:   Procedure Laterality Date    COLONOSCOPY - HIM SCAN N/A 03/01/2008    Colonoscopy lesion remove  3/2008    COLONOSCOPY - HIM SCAN N/A 03/09/2017    Colonoscopy screening 5 year recall due 3/2022    groin surgery  01/01/1999    IR CAROTID CEREBRAL ANGIOGRAM BILATERAL  11/13/2023    IR LUMBAR PUNCTURE  11/13/2023    STRABISMUS SURGERY Right 01/01/1972       Current Outpatient Medications   Medication Sig Dispense Refill    Acetaminophen 325 MG CAPS Take 325-650 mg by mouth every 4 hours as needed      ammonium lactate (AMLACTIN) 12 % external cream  APPLY TOPICALLY 2 TIMES DAILY 385 g 1    buPROPion (WELLBUTRIN XL) 300 MG 24 hr tablet Take 300 mg by mouth every morning.      busPIRone (BUSPAR) 5 MG tablet Take 5 mg by mouth 3 times daily      loratadine (CLARITIN) 10 MG tablet Take 10 mg by mouth daily      omeprazole (PRILOSEC) 40 MG DR capsule Take 1 capsule (40 mg) by mouth daily. 90 capsule 1    traMADol (ULTRAM) 50 MG tablet TAKE 1 TABLET BY MOUTH TWICE DAILY AS NEEDED FOR SEVERE PAIN 20 tablet 0    famotidine (PEPCID) 20 MG tablet TAKE (1) TABLET BY MOUTH TWICE A DAY. 90 tablet 1    lactase (LACTAID) 3000 UNIT tablet Take 1 tablet (3,000 Units) by mouth 3 times daily (with meals) (Patient not taking: Reported on 3/20/2025) 30 tablet 1    olopatadine (PATANOL) 0.1 % ophthalmic solution Place 1 drop into both eyes 2 times daily. (Patient not taking: Reported on 3/20/2025) 5 mL 3     No current facility-administered medications for this visit.        No Known Allergies    Family History   Problem Relation Age of Onset    Mental Illness Mother     Hypertension Mother     Cerebrovascular Disease Mother     Glaucoma Mother     Arthritis Mother     Osteoporosis Mother     Arthritis Father     Mental Illness Father     Hypertension Father     Arthritis Sister     Mental Illness Sister     Diabetes Maternal Grandmother     Diabetes Paternal Grandmother        Social History     Socioeconomic History    Marital status:      Spouse name: None    Number of children: None    Years of education: None    Highest education level: None   Tobacco Use    Smoking status: Former     Packs/day: 2.00     Types: Cigarettes    Smokeless tobacco: Never     Social Determinants of Health     Financial Resource Strain: Low Risk  (9/27/2023)    Financial Resource Strain     Within the past 12 months, have you or your family members you live with been unable to get utilities (heat, electricity) when it was really needed?: No   Food Insecurity: Low Risk  (9/27/2023)    Food  Insecurity     Within the past 12 months, did you worry that your food would run out before you got money to buy more?: No     Within the past 12 months, did the food you bought just not last and you didn t have money to get more?: No   Transportation Needs: Low Risk  (9/27/2023)    Transportation Needs     Within the past 12 months, has lack of transportation kept you from medical appointments, getting your medicines, non-medical meetings or appointments, work, or from getting things that you need?: No   Interpersonal Safety: Low Risk  (9/27/2023)    Interpersonal Safety     Do you feel physically and emotionally safe where you currently live?: Yes     Within the past 12 months, have you been hit, slapped, kicked or otherwise physically hurt by someone?: No     Within the past 12 months, have you been humiliated or emotionally abused in other ways by your partner or ex-partner?: No   Housing Stability: Low Risk  (9/27/2023)    Housing Stability     Do you have housing? : Yes     Are you worried about losing your housing?: No       ROS: 10 point ROS neg other than the symptoms noted above in the HPI.  EXAM  Constitutional: healthy, alert, and no distress    Psychiatric: mentation appears normal and affect normal/bright    VASCULAR:  -Dorsalis pedis pulse +2/4 b/l  -Posterior tibial pulse +2/4 b/l  -Capillary refill time < 3 seconds to b/l hallux  NEURO:  -Light touch sensation intact to b/l plantar forefoot  DERM:  -Skin temperature, texture and turgor WNL b/l  -Toenails thickened and dystrophic x 10    -Hyperkeratotic lesion on the RIGHT plantar 1st metatarsal head   -Hyperkeratotic lesion on the medial plantar distal aspect of the proximal phalanx of the RIGHT hallux  -Hyperkeratotic lesion on the RIGHT plantar fifth metatarsal head    MSK:  -Pain on palpation to the RIGHT lateral heel  -DORSIFLEXION contracture to MTPJ 2-5 b/l with flexion contracture to PIPJ of digits 2-5 b/l  ---RIGHT third toe is a flexible  contracture at the PIPJ and mildly flexible at the DIPJ  -RIGHT 1st MTPJ ROM within normal limits without forefoot loading but limited to less than 15 degrees with forefoot loading  -Muscle strength of ankles +5/5 for dorsiflexion, plantarflexion, ABDUction and ADDuction b/l    ============================================================    ASSESSMENT:    (L84) Callus of foot  (primary encounter diagnosis)    (L60.3) Onychodystrophy        PLAN:  -Patient evaluated and examined. Treatment options discussed with no educational barriers noted.    -Toenail debridement x 10 toenails without incident    -Callus pared x 3 to the RIGHT plantar 1st metatarsal head, RIGHT plantar 5th metatarsal head and the medial plantar distal aspect of the proximal phalanx of the RIGHT hallux  ---Patient reminded that the callus will likely return due to the underlying, prominent bone causing the callus while the patient is walking.    -Foot Education provided. This included checking the feet daily looking for new new blisters or wounds, wearing shoes at all times when walking including around the house, and avoiding lotion application between the toes. If there are any signs of infection, the patient should present to the ED as soon as possible. Infections of the foot can be life threatening or lead to amputations of the foot or leg.    -Patient in agreement with the above treatment plan and all of patient's questions were answered.      Return to clinic at scheduled 63+ day appointment for toenail debridement and callus sidra Nina DPM

## 2025-03-24 NOTE — PROGRESS NOTES
Assessment & Plan     Encounter to establish care  - Previously followed with Dr. Rivera  - Medical history, medication list reviewed and updated  - CRC screen up-to-date  - Due for annual physical; will schedule later this summer    Primary hypertension  Controlled.  Asymptomatic.  Not currently on hypertensive therapy.    ZOFIA (obstructive sleep apnea); on CPAP  CPAP compliant.    Dysthymia  Sounds like combination of depression/anxiety as well as PTSD.  Significant trauma in both childhood and adulthood.  Follows with [facility] for medication management and counseling.  Stable.  - Wellbutrin  mg daily  - BuSpar 5 mg 3 times daily    Chronic pain syndrome  Chronic, continuous use of opioids  Chronic pain of both knees  Has been maintained on daily tramadol for 15+ years, mainly for bilateral lower extremity pain, notable knee issue/arthritis.  Still working and functional.  No recent discussion of alternate treatment options or weaning.  Continue inherited regimen for now, preliminary discussion regarding shifting focus on management options.  - #60 tramadol per 30-day  - Dedicated chronic pain follow-up  - Needs UDS/pain contract  - Consider updated imaging, steroid injections, surgical options    Seasonal allergic rhinitis, unspecified trigger  As needed Claritin    Gastroesophageal reflux disease without esophagitis  Controlled on maintenance PPI and as needed H2 blocker.  - Prilosec 40 mg daily  - Pepcid as needed    Central serous chorioretinopathy of both eyes  Follows with [place] ophthalmology Dr. Patrick for central serous retinopathy, glaucoma.  Denies any recent changes.  - Ophthalmology follow-up 8/4/25    Moyamoya disease  Initially diagnosed as part of decreased hearing workup.  No other neurologic deficits.  Follows with [facility]; Dr. Bhakta.  - Neurosurgery follow-up 11/26/2025    I spent a total of 45 minutes on the day of the visit.   Time spent by me today  doing chart review, history and exam, documentation and further activities per the note    The longitudinal plan of care for the diagnosis(es)/condition(s) as documented were addressed during this visit. Due to the added complexity in care, I will continue to support Zaheer Mcgee in the subsequent management and with ongoing continuity of care.    Follow-up 3 months.    Subjective   Natan is a 55 year old, presenting for the following health issues:  Establish Care    History of Present Illness       Reason for visit:  Establish care with another dr because dr flood moved to hospital He is missing 1 dose(s) of medications per week.  He is not taking prescribed medications regularly due to remembering to take.        Establish care  -Previously followed with Dr. 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    Mental health  -History anxiety, depression, PTSD  -Number of traumatic events in the past  -Significant childhood trauma  -Lost his wife kidney failure August 2020  -Follows with [facility]; Raul Amador for medications, Amira Stoddard for counseling  -Current regimen bupropion, BuSpar    Chronic pain  -Has been dealing with arthritis pain, mostly knees and lower extremities for 15 to 20 years  -Initially tried some type of arthritis meds but did not have adequate relief  -Was started on tramadol 15+ years ago  -Has maintained 1-2 tabs of tramadol per day since then  -Extra strength Tylenol  -Has never really tried weaning  -Current as needed tramadol keeps him functional    Moyamoya disease  -Follows with Dr. Yony Maldonado neurosurgery  -Stable  -No focal deficits  -Annual surveillance visits with neurosurgery    Ophthalmology  -History of suspected central serous retinopathy, glaucoma  -Denies any recent changes  -Follows with Dr. Celina Maldonado ophthalmology    Medication Followup of Pepcid 20MG  tablet  Taking Medication as prescribed: yes  Side Effects:  None  Medication Helping Symptoms:  yes      Hypertension Follow-up  Do you check your blood pressure regularly outside of the clinic? No   Are you following a low salt diet? Yes  Are your blood pressures ever more than 140 on the top number (systolic) OR more   than 90 on the bottom number (diastolic), for example 140/90? N/A  -Not treating with meds lately  -Has been at goal even with home monitoring    Anxiety   How are you doing with your anxiety since your last visit? Worsened a little bit, sees a psychologist at [facility] once a month via video.   Are you having other symptoms that might be associated with anxiety? No  Have you had a significant life event? No   Are you feeling depressed? Yes:  a little bit  Do you have any concerns with your use of alcohol or other drugs? No    Social History     Tobacco Use    Smoking status: Former     Current packs/day: 2.00     Types: Cigarettes    Smokeless tobacco: Never   Substance Use Topics    Alcohol use: Not Currently         4/15/2024     1:04 PM 4/18/2024     8:18 AM   EDY-7 SCORE   Total Score  10 (moderate anxiety)   Total Score 0 10         4/18/2024     8:17 AM 3/25/2025     9:31 AM   PHQ   PHQ-9 Total Score 7 6    Q9: Thoughts of better off dead/self-harm past 2 weeks Not at all Not at all       Patient-reported         3/25/2025     9:31 AM   Last PHQ-9   1.  Little interest or pleasure in doing things 1   2.  Feeling down, depressed, or hopeless 1   3.  Trouble falling or staying asleep, or sleeping too much 0   4.  Feeling tired or having little energy 1   5.  Poor appetite or overeating 1   6.  Feeling bad about yourself 0   7.  Trouble concentrating 2   8.  Moving slowly or restless 0   Q9: Thoughts of better off dead/self-harm past 2 weeks 0   PHQ-9 Total Score 6        Patient-reported         4/18/2024     8:18 AM   EDY-7    1. Feeling nervous, anxious, or on edge 1   2. Not  being able to stop or control worrying 1   3. Worrying too much about different things 1   4. Trouble relaxing 3   5. Being so restless that it is hard to sit still 3   6. Becoming easily annoyed or irritable 0   7. Feeling afraid, as if something awful might happen 1   EDY-7 Total Score 10   If you checked any problems, how difficult have they made it for you to do your work, take care of things at home, or get along with other people? Somewhat difficult     Review of Systems  Constitutional, HEENT, cardiovascular, pulmonary, gi and gu systems are negative, except as otherwise noted.      Objective    /78 (BP Location: Right arm, Patient Position: Sitting, Cuff Size: Adult Large)   Pulse 88   Temp 99  F (37.2  C) (Tympanic)   Resp 14   Wt 91 kg (200 lb 9.6 oz)   SpO2 96%   BMI 31.42 kg/m    Body mass index is 31.42 kg/m .  Physical Exam  Vitals reviewed.   Constitutional:       Appearance: Normal appearance.   HENT:      Head: Normocephalic and atraumatic.   Cardiovascular:      Rate and Rhythm: Normal rate and regular rhythm.      Heart sounds: No murmur heard.  Pulmonary:      Effort: Pulmonary effort is normal.      Breath sounds: Normal breath sounds. No stridor. No wheezing, rhonchi or rales.   Musculoskeletal:         General: Normal range of motion.   Skin:     General: Skin is warm and dry.   Neurological:      General: No focal deficit present.      Mental Status: He is alert and oriented to person, place, and time.   Psychiatric:         Mood and Affect: Mood normal.         Behavior: Behavior normal.          Signed Electronically by: Iraj Chino MD

## 2025-03-25 ENCOUNTER — OFFICE VISIT (OUTPATIENT)
Dept: FAMILY MEDICINE | Facility: OTHER | Age: 55
End: 2025-03-25
Attending: STUDENT IN AN ORGANIZED HEALTH CARE EDUCATION/TRAINING PROGRAM
Payer: COMMERCIAL

## 2025-03-25 VITALS
SYSTOLIC BLOOD PRESSURE: 124 MMHG | RESPIRATION RATE: 14 BRPM | OXYGEN SATURATION: 96 % | HEART RATE: 88 BPM | TEMPERATURE: 99 F | WEIGHT: 200.6 LBS | BODY MASS INDEX: 31.42 KG/M2 | DIASTOLIC BLOOD PRESSURE: 78 MMHG

## 2025-03-25 DIAGNOSIS — M25.561 CHRONIC PAIN OF BOTH KNEES: ICD-10-CM

## 2025-03-25 DIAGNOSIS — J30.2 SEASONAL ALLERGIC RHINITIS, UNSPECIFIED TRIGGER: ICD-10-CM

## 2025-03-25 DIAGNOSIS — F11.90 CHRONIC, CONTINUOUS USE OF OPIOIDS: ICD-10-CM

## 2025-03-25 DIAGNOSIS — F34.1 DYSTHYMIA: ICD-10-CM

## 2025-03-25 DIAGNOSIS — G89.29 CHRONIC PAIN OF BOTH KNEES: ICD-10-CM

## 2025-03-25 DIAGNOSIS — I67.5 MOYAMOYA DISEASE: ICD-10-CM

## 2025-03-25 DIAGNOSIS — I10 PRIMARY HYPERTENSION: ICD-10-CM

## 2025-03-25 DIAGNOSIS — K21.9 GASTROESOPHAGEAL REFLUX DISEASE WITHOUT ESOPHAGITIS: ICD-10-CM

## 2025-03-25 DIAGNOSIS — Z76.89 ENCOUNTER TO ESTABLISH CARE: Primary | ICD-10-CM

## 2025-03-25 DIAGNOSIS — G89.4 CHRONIC PAIN SYNDROME: ICD-10-CM

## 2025-03-25 DIAGNOSIS — G47.33 OSA (OBSTRUCTIVE SLEEP APNEA): ICD-10-CM

## 2025-03-25 DIAGNOSIS — H35.713 CENTRAL SEROUS CHORIORETINOPATHY OF BOTH EYES: ICD-10-CM

## 2025-03-25 DIAGNOSIS — M25.562 CHRONIC PAIN OF BOTH KNEES: ICD-10-CM

## 2025-03-25 PROBLEM — F90.9 ADHD: Status: RESOLVED | Noted: 2019-12-20 | Resolved: 2025-03-25

## 2025-03-25 RX ORDER — TRAMADOL HYDROCHLORIDE 50 MG/1
50 TABLET ORAL 2 TIMES DAILY PRN
Qty: 20 TABLET | Refills: 0 | Status: CANCELLED | OUTPATIENT
Start: 2025-03-25

## 2025-03-25 ASSESSMENT — PATIENT HEALTH QUESTIONNAIRE - PHQ9
10. IF YOU CHECKED OFF ANY PROBLEMS, HOW DIFFICULT HAVE THESE PROBLEMS MADE IT FOR YOU TO DO YOUR WORK, TAKE CARE OF THINGS AT HOME, OR GET ALONG WITH OTHER PEOPLE: SOMEWHAT DIFFICULT
SUM OF ALL RESPONSES TO PHQ QUESTIONS 1-9: 6
SUM OF ALL RESPONSES TO PHQ QUESTIONS 1-9: 6

## 2025-03-25 ASSESSMENT — PAIN SCALES - GENERAL: PAINLEVEL_OUTOF10: MODERATE PAIN (5)

## 2025-04-02 ENCOUNTER — TRANSFERRED RECORDS (OUTPATIENT)
Dept: HEALTH INFORMATION MANAGEMENT | Facility: CLINIC | Age: 55
End: 2025-04-02
Payer: COMMERCIAL

## 2025-04-03 ENCOUNTER — MEDICAL CORRESPONDENCE (OUTPATIENT)
Dept: MRI IMAGING | Facility: HOSPITAL | Age: 55
End: 2025-04-03

## 2025-04-04 ENCOUNTER — APPOINTMENT (OUTPATIENT)
Dept: OCCUPATIONAL MEDICINE | Facility: OTHER | Age: 55
End: 2025-04-04

## 2025-04-04 PROCEDURE — 99000 SPECIMEN HANDLING OFFICE-LAB: CPT

## 2025-04-08 DIAGNOSIS — G89.29 OTHER CHRONIC PAIN: ICD-10-CM

## 2025-04-08 RX ORDER — TRAMADOL HYDROCHLORIDE 50 MG/1
50 TABLET ORAL 2 TIMES DAILY PRN
Qty: 30 TABLET | Refills: 0 | Status: SHIPPED | OUTPATIENT
Start: 2025-04-08

## 2025-04-08 NOTE — TELEPHONE ENCOUNTER
traMADol (ULTRAM) 50 MG tablet       Last Written Prescription Date:  3/12/25  Last Fill Quantity: 20,   # refills: 0  Last Office Visit: 3/25/25  Future Office visit:    Next 5 appointments (look out 90 days)      May 29, 2025 8:00 AM  (Arrive by 7:45 AM)  Return Visit with Hollie Nina DPM  [facility] ([facility] ) [address] [place] [id number]  [phone]     Jun 30, 2025 10:30 AM  (Arrive by 10:15 AM)  Adult Preventative Visit with Iraj Chino MD  [facility] ([facility] ) [address] [place] [id number]  [phone]             Routing refill request to provider for review/approval because:  Rx Protocol Controlled Substance Failed      Visit with relevant provider in past 3 months or upcoming 3 months      Medication not refilled in past 28 days    Invalid Medication Grouper    Auto Fail - Please forward to Provider      Naloxone on active med list      EDY-7 done in past year    Please review last EDY-7 score.        4/15/2024     1:04 PM 4/18/2024     8:18 AM   EDY-7 SCORE   Total Score   10 (moderate anxiety)   Total Score 0 10

## 2025-04-10 ENCOUNTER — HOSPITAL ENCOUNTER (OUTPATIENT)
Dept: MRI IMAGING | Facility: HOSPITAL | Age: 55
Discharge: HOME | End: 2025-04-10
Attending: ORTHOPAEDIC SURGERY
Payer: COMMERCIAL

## 2025-04-10 DIAGNOSIS — T14.90XA INJURY: ICD-10-CM

## 2025-04-10 DIAGNOSIS — S46.219A BICEPS MUSCLE TEAR: ICD-10-CM

## 2025-04-10 PROCEDURE — 73221 MRI JOINT UPR EXTREM W/O DYE: CPT | Mod: RT

## 2025-04-21 DIAGNOSIS — G89.29 OTHER CHRONIC PAIN: ICD-10-CM

## 2025-04-21 DIAGNOSIS — K21.00 GASTROESOPHAGEAL REFLUX DISEASE WITH ESOPHAGITIS WITHOUT HEMORRHAGE: ICD-10-CM

## 2025-04-21 RX ORDER — TRAMADOL HYDROCHLORIDE 50 MG/1
50 TABLET ORAL 2 TIMES DAILY PRN
Qty: 60 TABLET | Refills: 0 | Status: SHIPPED | OUTPATIENT
Start: 2025-04-21

## 2025-04-21 RX ORDER — OMEPRAZOLE 40 MG/1
40 CAPSULE, DELAYED RELEASE ORAL DAILY
Qty: 90 CAPSULE | Refills: 1 | Status: SHIPPED | OUTPATIENT
Start: 2025-04-21

## 2025-04-21 NOTE — TELEPHONE ENCOUNTER
traMADol (ULTRAM) 50 MG tablet         Last Written Prescription Date:  4/8/25  Last Fill Quantity: 30,   # refills: 0  Last Office Visit: 3/25/25  Future Office visit:    Next 5 appointments (look out 90 days)      May 29, 2025 8:00 AM  (Arrive by 7:45 AM)  Return Visit with Hollie Nina DPM  [facility] ([facility] ) [address] [place] [phone]  [phone]     Jun 30, 2025 10:30 AM  (Arrive by 10:15 AM)  Adult Preventative Visit with Iraj Chino MD  [facility] ([facility] ) [address] [place] [id number]  [phone]             Routing refill request to provider for review/approval because:  Drug not on the [facility], [facility] or  Health refill protocol or controlled substance

## 2025-04-21 NOTE — TELEPHONE ENCOUNTER
Prilosec       Last Written Prescription Date:  9/30/2024  Last Fill Quantity: 90,   # refills: 1  Last Office Visit: 3/25/2025  Future Office visit:    Next 5 appointments (look out 90 days)      May 29, 2025 8:00 AM  (Arrive by 7:45 AM)  Return Visit with Hollie Nina DPM  [facility] ([facility] ) [address] [place] [phone] [phone]     Jun 30, 2025 10:30 AM  (Arrive by 10:15 AM)  Adult Preventative Visit with Iraj Chino MD  [facility] ([facility] ) [address] [place] [id number]  [phone]

## 2025-04-23 ENCOUNTER — HOSPITAL ENCOUNTER (OUTPATIENT)
Facility: HOSPITAL | Age: 55
End: 2025-04-23
Attending: ORTHOPAEDIC SURGERY | Admitting: ORTHOPAEDIC SURGERY
Payer: COMMERCIAL

## 2025-06-17 ENCOUNTER — ALLIED HEALTH/NURSE VISIT (OUTPATIENT)
Dept: AUDIOLOGY | Facility: OTHER | Age: 55
End: 2025-06-17
Attending: AUDIOLOGIST
Payer: COMMERCIAL

## 2025-06-17 DIAGNOSIS — H91.93 DECREASED HEARING OF BOTH EARS: Primary | ICD-10-CM

## 2025-06-17 NOTE — PROGRESS NOTES
HEARING AID CHECK    BACKGROUND:  Zaheer Mcgee, a 55 year old male, was seen today for an hearing aid check.  Mr. Mcgee wears Binaural Oticon Real 2 miniRITE R.  Mr. Mcgee reported left hearing aid not connecting to phone.    FINDINGS:  Visual inspection and cleaning provided.   C-stop showed cerumen impaction and changed with new. 10 mm open domes changed with new. Battery was tested and good. Good listening check. Updated firmware on both aids through [employer].    Reviewed cleaning, troubleshooting, and preventative care with patient. Any cleaning tools used were provided as customer courtesy.    Services performed and warranty reviewed with patient.    PLAN:  Based on patient report, no audiology appointment for adjustments needed.Mr. Mcgee will return to clinic in 12 months, sooner if needed. Patient had no further questions and reports satisfaction.         Alanna Cochran  Audiology Assistant  [facility]  [phone]

## 2025-06-29 DIAGNOSIS — G89.29 OTHER CHRONIC PAIN: ICD-10-CM

## 2025-06-30 RX ORDER — TRAMADOL HYDROCHLORIDE 50 MG/1
50 TABLET ORAL 2 TIMES DAILY PRN
Qty: 60 TABLET | Refills: 0 | Status: SHIPPED | OUTPATIENT
Start: 2025-06-30

## 2025-06-30 NOTE — TELEPHONE ENCOUNTER
traMADol (ULTRAM) 50 MG tablet       Last Written Prescription Date:  4/21/25  Last Fill Quantity: 60,   # refills: 0  Last Office Visit: 3/25/25  Future Office visit:       Routing refill request to provider for review/approval because:  Drug not on the [facility], [facility] or [facility] refill protocol or controlled substance

## 2025-07-06 ENCOUNTER — HEALTH MAINTENANCE LETTER (OUTPATIENT)
Age: 55
End: 2025-07-06

## 2025-07-09 NOTE — OR NURSING
Called pt to do PAT call for scheduled ortho procedure on 7/16.  States he will call to reschedule in October or November.  He is working out of town now. Msg sent to the pool.

## 2025-07-24 DIAGNOSIS — H40.003 GLAUCOMA SUSPECT OF BOTH EYES: Primary | ICD-10-CM

## 2025-07-24 DIAGNOSIS — H35.713 CENTRAL SEROUS CHORIORETINOPATHY OF BOTH EYES: ICD-10-CM
